# Patient Record
Sex: FEMALE | Race: WHITE | NOT HISPANIC OR LATINO | Employment: FULL TIME | URBAN - METROPOLITAN AREA
[De-identification: names, ages, dates, MRNs, and addresses within clinical notes are randomized per-mention and may not be internally consistent; named-entity substitution may affect disease eponyms.]

---

## 2019-07-05 ENCOUNTER — APPOINTMENT (EMERGENCY)
Dept: RADIOLOGY | Facility: HOSPITAL | Age: 49
End: 2019-07-05
Payer: COMMERCIAL

## 2019-07-05 ENCOUNTER — HOSPITAL ENCOUNTER (EMERGENCY)
Facility: HOSPITAL | Age: 49
Discharge: HOME | End: 2019-07-05
Attending: EMERGENCY MEDICINE | Admitting: EMERGENCY MEDICINE
Payer: COMMERCIAL

## 2019-07-05 VITALS
TEMPERATURE: 97.9 F | SYSTOLIC BLOOD PRESSURE: 128 MMHG | HEART RATE: 72 BPM | WEIGHT: 192 LBS | RESPIRATION RATE: 18 BRPM | DIASTOLIC BLOOD PRESSURE: 76 MMHG | OXYGEN SATURATION: 96 %

## 2019-07-05 DIAGNOSIS — R07.9 CHEST PAIN: Primary | ICD-10-CM

## 2019-07-05 LAB
ALBUMIN SERPL BCP-MCNC: 4.2 G/DL (ref 3.5–5)
ALP SERPL-CCNC: 90 U/L (ref 46–116)
ALT SERPL W P-5'-P-CCNC: 54 U/L (ref 12–78)
ANION GAP SERPL CALCULATED.3IONS-SCNC: 9 MMOL/L (ref 4–13)
AST SERPL W P-5'-P-CCNC: 20 U/L (ref 5–45)
BASOPHILS # BLD AUTO: 0.04 THOUSANDS/ΜL (ref 0–0.1)
BASOPHILS NFR BLD AUTO: 1 % (ref 0–1)
BILIRUB SERPL-MCNC: 0.4 MG/DL (ref 0.2–1)
BUN SERPL-MCNC: 12 MG/DL (ref 5–25)
CALCIUM SERPL-MCNC: 8.8 MG/DL (ref 8.3–10.1)
CHLORIDE SERPL-SCNC: 102 MMOL/L (ref 100–108)
CO2 SERPL-SCNC: 28 MMOL/L (ref 21–32)
CREAT SERPL-MCNC: 0.68 MG/DL (ref 0.6–1.3)
DEPRECATED D DIMER PPP: 220 NG/ML (FEU) (ref 190–520)
EOSINOPHIL # BLD AUTO: 0.07 THOUSAND/ΜL (ref 0–0.61)
EOSINOPHIL NFR BLD AUTO: 1 % (ref 0–6)
ERYTHROCYTE [DISTWIDTH] IN BLOOD BY AUTOMATED COUNT: 12.8 % (ref 11.6–15.1)
GFR SERPL CREATININE-BSD FRML MDRD: 102 ML/MIN/1.73SQ M
GLUCOSE SERPL-MCNC: 120 MG/DL (ref 65–140)
HCG SERPL QL: NEGATIVE
HCT VFR BLD AUTO: 42.6 % (ref 34.8–46.1)
HGB BLD-MCNC: 13.8 G/DL (ref 11.5–15.4)
IMM GRANULOCYTES # BLD AUTO: 0.01 THOUSAND/UL (ref 0–0.2)
IMM GRANULOCYTES NFR BLD AUTO: 0 % (ref 0–2)
LIPASE SERPL-CCNC: 152 U/L (ref 73–393)
LYMPHOCYTES # BLD AUTO: 1.68 THOUSANDS/ΜL (ref 0.6–4.47)
LYMPHOCYTES NFR BLD AUTO: 31 % (ref 14–44)
MAGNESIUM SERPL-MCNC: 2.1 MG/DL (ref 1.6–2.6)
MCH RBC QN AUTO: 30.5 PG (ref 26.8–34.3)
MCHC RBC AUTO-ENTMCNC: 32.4 G/DL (ref 31.4–37.4)
MCV RBC AUTO: 94 FL (ref 82–98)
MONOCYTES # BLD AUTO: 0.49 THOUSAND/ΜL (ref 0.17–1.22)
MONOCYTES NFR BLD AUTO: 9 % (ref 4–12)
NEUTROPHILS # BLD AUTO: 3.07 THOUSANDS/ΜL (ref 1.85–7.62)
NEUTS SEG NFR BLD AUTO: 58 % (ref 43–75)
NRBC BLD AUTO-RTO: 0 /100 WBCS
PLATELET # BLD AUTO: 295 THOUSANDS/UL (ref 149–390)
PMV BLD AUTO: 10.6 FL (ref 8.9–12.7)
POTASSIUM SERPL-SCNC: 4.2 MMOL/L (ref 3.5–5.3)
PROT SERPL-MCNC: 7.9 G/DL (ref 6.4–8.2)
RBC # BLD AUTO: 4.52 MILLION/UL (ref 3.81–5.12)
SODIUM SERPL-SCNC: 139 MMOL/L (ref 136–145)
TROPONIN I SERPL-MCNC: <0.02 NG/ML
WBC # BLD AUTO: 5.36 THOUSAND/UL (ref 4.31–10.16)

## 2019-07-05 PROCEDURE — 85025 COMPLETE CBC W/AUTO DIFF WBC: CPT | Performed by: EMERGENCY MEDICINE

## 2019-07-05 PROCEDURE — 84484 ASSAY OF TROPONIN QUANT: CPT | Performed by: EMERGENCY MEDICINE

## 2019-07-05 PROCEDURE — 71046 X-RAY EXAM CHEST 2 VIEWS: CPT

## 2019-07-05 PROCEDURE — 99285 EMERGENCY DEPT VISIT HI MDM: CPT

## 2019-07-05 PROCEDURE — 83735 ASSAY OF MAGNESIUM: CPT | Performed by: EMERGENCY MEDICINE

## 2019-07-05 PROCEDURE — 85379 FIBRIN DEGRADATION QUANT: CPT | Performed by: EMERGENCY MEDICINE

## 2019-07-05 PROCEDURE — 80053 COMPREHEN METABOLIC PANEL: CPT | Performed by: EMERGENCY MEDICINE

## 2019-07-05 PROCEDURE — 84703 CHORIONIC GONADOTROPIN ASSAY: CPT | Performed by: EMERGENCY MEDICINE

## 2019-07-05 PROCEDURE — 36415 COLL VENOUS BLD VENIPUNCTURE: CPT | Performed by: EMERGENCY MEDICINE

## 2019-07-05 PROCEDURE — 83690 ASSAY OF LIPASE: CPT | Performed by: EMERGENCY MEDICINE

## 2019-07-05 PROCEDURE — 93005 ELECTROCARDIOGRAM TRACING: CPT

## 2019-07-05 RX ORDER — MAGNESIUM 30 MG
30 TABLET ORAL 2 TIMES DAILY
COMMUNITY

## 2019-07-05 RX ORDER — PANTOPRAZOLE SODIUM 40 MG/1
40 TABLET, DELAYED RELEASE ORAL DAILY
COMMUNITY
End: 2020-12-28 | Stop reason: SDUPTHER

## 2019-07-05 NOTE — ED PROVIDER NOTES
History  Chief Complaint   Patient presents with    Chest Pain     pt describes CP since 6am wednesday morning  episode at [facility] morning awoke pt 10/10 pain sub-sternal  pt then describes intermittent CP since then but constant over past day at 5/10       Chest Pain   Pain location:  Substernal area and epigastric  Pain quality: pressure    Pain radiates to:  Does not radiate  Pain radiates to the back: no    Pain severity:  Moderate  Onset quality:  Gradual  Duration:  3 days  Timing:  Intermittent  Progression:  Waxing and waning  Chronicity:  Recurrent  Context comment:  Lying down, improves with certain positions  Relieved by:  Nothing  Worsened by:  Nothing tried  Ineffective treatments:  None tried  Associated symptoms: no abdominal pain, no cough, no fever, no shortness of breath and not vomiting    Risk factors: surgery    Risk factors: no high cholesterol, no hypertension, not male, no prior DVT/PE and no smoking        Prior to Admission Medications   Prescriptions Last Dose Informant Patient Reported? Taking?   magnesium 30 MG tablet Unknown at Unknown time  Yes No   Sig: Take 30 mg by mouth 2 (two) times a day   pantoprazole (PROTONIX) 40 mg tablet Unknown at Unknown time  Yes No   Sig: Take 40 mg by mouth daily      Facility-Administered Medications: None       Past Medical History:   Diagnosis Date    Gastric ulcer     [facility]     V-tach [facility])        Past Surgical History:   Procedure Laterality Date    ABDOMINAL SURGERY      colon sx (adnoma removed)    CARDIAC SURGERY      ablasion 09/2015 ([facility])    CHOLECYSTECTOMY         History reviewed  No pertinent family history  I have reviewed and agree with the history as documented      Social History     Tobacco Use    Smoking status: Never Smoker    Smokeless tobacco: Never Used   Substance Use Topics    Alcohol use: Not Currently    Drug use: Not Currently        Review of Systems Constitutional: Negative for activity change, appetite change and fever  Eyes: Negative for redness  Respiratory: Negative for cough and shortness of breath  Cardiovascular: Positive for chest pain  Gastrointestinal: Negative for abdominal distention, abdominal pain, constipation, diarrhea and vomiting  Skin: Negative for color change and pallor  All other systems reviewed and are negative  Physical Exam  Physical Exam   Constitutional: She is oriented to person, place, and time  She appears well-developed and well-nourished  No distress  HENT:   Head: Normocephalic and atraumatic  Eyes: EOM are normal    Neck: Normal range of motion  Cardiovascular: Normal rate, regular rhythm and normal heart sounds  No murmur heard  Pulmonary/Chest: Effort normal and breath sounds normal  No respiratory distress  She has no wheezes  She has no rales  Abdominal: Soft  She exhibits no distension  There is no tenderness  Musculoskeletal: Normal range of motion  She exhibits no edema or deformity  Lymphadenopathy:     She has no cervical adenopathy  Neurological: She is alert and oriented to person, place, and time  No cranial nerve deficit  She exhibits normal muscle tone  Coordination normal    Skin: Capillary refill takes less than 2 seconds  No rash noted  No erythema  Psychiatric: She has a normal mood and affect  Her behavior is normal    Nursing note and vitals reviewed        Vital Signs  ED Triage Vitals [07/05/19 1133]   Temperature Pulse Respirations Blood Pressure SpO2   97 9 °F (36 6 °C) 88 18 157/77 99 %      Temp Source Heart Rate Source Patient Position - Orthostatic VS BP Location FiO2 (%)   Tympanic Monitor Sitting Right arm --      Pain Score       5           Vitals:    07/05/19 1133 07/05/19 1215 07/05/19 1245   BP: 157/77 135/81 128/76   Pulse: 88 76 72   Patient Position - Orthostatic VS: Sitting Sitting Sitting         Visual Acuity      ED Medications  Medications - No data to display    Diagnostic Studies  Results Reviewed     Procedure Component Value Units Date/Time    Magnesium [[id number]]  (Normal) Collected:  07/05/19 1154    Lab Status:  Final result Specimen:  Blood from Arm, Right Updated:  07/05/19 1229     Magnesium 2 1 mg/dL     Lipase [[id number]]  (Normal) Collected:  07/05/19 1154    Lab Status:  Final result Specimen:  Blood from Arm, Right Updated:  07/05/19 1229     Lipase 152 u/L     hCG, qualitative pregnancy [[id number]]  (Normal) Collected:  07/05/19 1154    Lab Status:  Final result Specimen:  Blood from Arm, Right Updated:  07/05/19 1229     Preg, Serum Negative    Troponin I [[id number]]  (Normal) Collected:  07/05/19 1154    Lab Status:  Final result Specimen:  Blood from Arm, Right Updated:  07/05/19 1225     Troponin I <0 02 ng/mL     Comprehensive metabolic panel [[id number]] Collected:  07/05/19 1154    Lab Status:  Final result Specimen:  Blood from Arm, Right Updated:  07/05/19 1222     Sodium 139 mmol/L      Potassium 4 2 mmol/L      Chloride 102 mmol/L      CO2 28 mmol/L      ANION GAP 9 mmol/L      BUN 12 mg/dL      Creatinine 0 68 mg/dL      Glucose 120 mg/dL      Calcium 8 8 mg/dL      AST 20 U/L      ALT 54 U/L      Alkaline Phosphatase 90 U/L      Total Protein 7 9 g/dL      Albumin 4 2 g/dL      Total Bilirubin 0 40 mg/dL      eGFR 102 ml/min/1 73sq m     Narrative:       Dilcia guidelines for Chronic Kidney Disease (CKD):     Stage 1 with normal or high GFR (GFR > 90 mL/min/1 73 square meters)    Stage 2 Mild CKD (GFR = 60-89 mL/min/1 73 square meters)    Stage 3A Moderate CKD (GFR = 45-59 mL/min/1 73 square meters)    Stage 3B Moderate CKD (GFR = 30-44 mL/min/1 73 square meters)    Stage 4 Severe CKD (GFR = 15-29 mL/min/1 73 square meters)    Stage 5 End Stage CKD (GFR <15 mL/min/1 73 square meters)  Note: GFR calculation is accurate only with a steady state creatinine    D-Dimer [[id number]]  (Normal) Collected:  07/05/19 1154    Lab Status:  Final result Specimen:  Blood from Arm, Right Updated:  07/05/19 1221     D-Dimer, Quant 220 ng/ml (FEU)     CBC and differential [[id number]] Collected:  07/05/19 1154    Lab Status:  Final result Specimen:  Blood from Arm, Right Updated:  07/05/19 1206     WBC 5 36 Thousand/uL      RBC 4 52 Million/uL      Hemoglobin 13 8 g/dL      Hematocrit 42 6 %      MCV 94 fL      MCH 30 5 pg      MCHC 32 4 g/dL      RDW 12 8 %      MPV 10 6 fL      Platelets 195 Thousands/uL      nRBC 0 /100 WBCs      Neutrophils Relative 58 %      Immat GRANS % 0 %      Lymphocytes Relative 31 %      Monocytes Relative 9 %      Eosinophils Relative 1 %      Basophils Relative 1 %      Neutrophils Absolute 3 07 Thousands/µL      Immature Grans Absolute 0 01 Thousand/uL      Lymphocytes Absolute 1 68 Thousands/µL      Monocytes Absolute 0 49 Thousand/µL      Eosinophils Absolute 0 07 Thousand/µL      Basophils Absolute 0 04 Thousands/µL                  XR chest 2 views   ED Interpretation by Kate Bawja MD (07/05 1317)   NAD                 Procedures  Procedures       ED Course         HEART Risk Score      Most Recent Value   History  0 Filed at: 07/05/2019 1246   ECG  0 Filed at: 07/05/2019 1246   Age  1 Filed at: 07/05/2019 1246   Risk Factors  0 Filed at: 07/05/2019 1246   Troponin  0 Filed at: 07/05/2019 1246   Heart Score Risk Calculator   History  0 Filed at: 07/05/2019 1246   ECG  0 Filed at: 07/05/2019 1246   Age  1 Filed at: 07/05/2019 1246   Risk Factors  0 Filed at: 07/05/2019 1246   Troponin  0 Filed at: 07/05/2019 1246   HEART Score  1 Filed at: 07/05/2019 1246   HEART Score  1 Filed at: 07/05/2019 1246            PERC Rule for PE      Most Recent Value   PERC Rule for PE   Age >=50  1 Filed at: 07/05/2019 1309   HR >=100  0 Filed at: 07/05/2019 1309   O2 Sat on room air < 95%  0 Filed at: 07/05/2019 1309   History of PE or DVT  0 Filed at: 07/05/2019 1309   Recent trauma or surgery  0 Filed at: 07/05/2019 1309   Hemoptysis  0 Filed at: 07/05/2019 1309   Exogenous estrogen  0 Filed at: 07/05/2019 1309   Unilateral leg swelling  0 Filed at: 07/05/2019 1309   PERC Rule for PE Results  1 Filed at: 07/05/2019 1309                Bennie' Criteria for PE      Most Recent Value   Wells' Criteria for PE   Clinical signs and symptoms of DVT  0 Filed at: 07/05/2019 1309   PE is primary diagnosis or equally likely  0 Filed at: 07/05/2019 1309   HR >100  0 Filed at: 07/05/2019 1309   Immobilization at least 3 days or Surgery in the previous 4 weeks  0 Filed at: 07/05/2019 1309   Previous, objectively diagnosed PE or DVT  0 Filed at: 07/05/2019 1309   Hemoptysis  0 Filed at: 07/05/2019 1309   Malignancy with treatment within 6 months or palliative  0 Filed at: 07/05/2019 1309   Bennie' Criteria Total  0 Filed at: 07/05/2019 1309            MDM  Number of Diagnoses or Management Options  Diagnosis management comments: Patient with substernal/epigastric chest pain described as pressure  Also with fatigue  Pain started on Wednesday  She called her primary care physician today and was asked to come the emergency department for evaluation  She describes pain going to her right shoulder and somewhat associated breathing  She has no previous history of ACS, PE, DVT  She has a long history of GERD, gastritis and has had cardiac ablation in the past   She has no history of ACS  Pain has improved  She declines need for medications while in ER  Vital signs stable  Patient overall appears okay well  EKG with no acute findings, similar to previous  Troponin negative, D-dimer negative, lipase, CMP negative  Chest x-ray with no acute cardiopulmonary abnormality  Suspect symptoms related to GERD  Patient on Protonix  She has a gastroenterologist that she will follow up with for tries to her regimen  No indication for 2nd troponin as chest pain has been going on for 3 days    Low heart score, no additional ER or hospital workup indicated  Amount and/or Complexity of Data Reviewed  Clinical lab tests: ordered and reviewed  Tests in the radiology section of CPT®: ordered  Tests in the medicine section of CPT®: ordered and reviewed  Independent visualization of images, tracings, or specimens: yes        Disposition  Final diagnoses:   Chest pain     Time reflects when diagnosis was documented in both MDM as applicable and the Disposition within this note     Time User Action Codes Description Comment    7/5/2019  1:06 PM Ольга Early Add [R07 9] Chest pain       ED Disposition     ED Disposition Condition Date/Time Comment    Discharge Stable Fri Jul 5, 2019  1:16 PM Nikolas Pennington discharge to home/self care  Follow-up Information     Follow up With Specialties Details Why Contact Info Additional Information    Your Gastroenterologist  Schedule an appointment as soon as possible for a visit in 1 week As needed      [address] [facility] Emergency Medicine Go to  If symptoms worsen [address] [place] [address]  [phone] [facility], [place], [place], [address]          Patient's Medications   Discharge Prescriptions    No medications on file     No discharge procedures on file      ED Provider  Electronically Signed by           Christ Glass MD  07/05/19 3889

## 2019-07-05 NOTE — ED NOTES
Pt reports pain to right anterior chest has been noted to be more associated with deep respirations today  Dr Zen Powell aware  D-dimer ordered         Sigrid Johnson RN  07/05/19 9052

## 2019-07-06 LAB
ATRIAL RATE: 82 BPM
P AXIS: 20 DEGREES
PR INTERVAL: 128 MS
QRS AXIS: 90 DEGREES
QRSD INTERVAL: 80 MS
QT INTERVAL: 352 MS
QTC INTERVAL: 411 MS
T WAVE AXIS: 34 DEGREES
VENTRICULAR RATE: 82 BPM

## 2019-07-06 PROCEDURE — 93010 ELECTROCARDIOGRAM REPORT: CPT | Performed by: INTERNAL MEDICINE

## 2020-09-28 ENCOUNTER — APPOINTMENT (EMERGENCY)
Dept: RADIOLOGY | Facility: HOSPITAL | Age: 50
End: 2020-09-28
Payer: COMMERCIAL

## 2020-09-28 ENCOUNTER — HOSPITAL ENCOUNTER (EMERGENCY)
Facility: HOSPITAL | Age: 50
Discharge: HOME | End: 2020-09-29
Attending: EMERGENCY MEDICINE
Payer: COMMERCIAL

## 2020-09-28 VITALS
RESPIRATION RATE: 20 BRPM | OXYGEN SATURATION: 97 % | SYSTOLIC BLOOD PRESSURE: 154 MMHG | WEIGHT: 203 LBS | DIASTOLIC BLOOD PRESSURE: 74 MMHG | TEMPERATURE: 96.3 F | HEART RATE: 90 BPM

## 2020-09-28 DIAGNOSIS — M54.9 BACK PAIN: Primary | ICD-10-CM

## 2020-09-28 PROCEDURE — G1004 CDSM NDSC: HCPCS

## 2020-09-28 PROCEDURE — 72128 CT CHEST SPINE W/O DYE: CPT

## 2020-09-28 PROCEDURE — 71046 X-RAY EXAM CHEST 2 VIEWS: CPT

## 2020-09-28 PROCEDURE — 72125 CT NECK SPINE W/O DYE: CPT

## 2020-09-28 PROCEDURE — 99284 EMERGENCY DEPT VISIT MOD MDM: CPT | Performed by: EMERGENCY MEDICINE

## 2020-09-28 PROCEDURE — 99284 EMERGENCY DEPT VISIT MOD MDM: CPT

## 2020-09-28 RX ORDER — LIDOCAINE 50 MG/G
1 PATCH TOPICAL ONCE
Status: DISCONTINUED | OUTPATIENT
Start: 2020-09-28 | End: 2020-09-29 | Stop reason: HOSPADM

## 2020-09-28 RX ORDER — NAPROXEN 500 MG/1
500 TABLET ORAL 2 TIMES DAILY WITH MEALS
Qty: 20 TABLET | Refills: 0 | Status: SHIPPED | OUTPATIENT
Start: 2020-09-28

## 2020-09-28 RX ORDER — DIAZEPAM 5 MG/1
10 TABLET ORAL ONCE
Status: COMPLETED | OUTPATIENT
Start: 2020-09-28 | End: 2020-09-28

## 2020-09-28 RX ORDER — PREDNISONE 50 MG/1
50 TABLET ORAL DAILY
Qty: 5 TABLET | Refills: 0 | Status: SHIPPED | OUTPATIENT
Start: 2020-09-28 | End: 2020-10-03

## 2020-09-28 RX ORDER — LIDOCAINE 50 MG/G
1 PATCH TOPICAL DAILY
Qty: 30 PATCH | Refills: 0 | Status: SHIPPED | OUTPATIENT
Start: 2020-09-28 | End: 2020-12-28

## 2020-09-28 RX ORDER — NAPROXEN 500 MG/1
500 TABLET ORAL ONCE
Status: COMPLETED | OUTPATIENT
Start: 2020-09-28 | End: 2020-09-28

## 2020-09-28 RX ORDER — CYCLOBENZAPRINE HCL 10 MG
10 TABLET ORAL ONCE
Status: DISCONTINUED | OUTPATIENT
Start: 2020-09-28 | End: 2020-09-28

## 2020-09-28 RX ORDER — DIAZEPAM 10 MG/1
10 TABLET ORAL EVERY 12 HOURS PRN
Qty: 20 TABLET | Refills: 0 | Status: SHIPPED | OUTPATIENT
Start: 2020-09-28 | End: 2020-12-28

## 2020-09-28 RX ADMIN — DIAZEPAM 10 MG: 5 TABLET ORAL at 21:35

## 2020-09-28 RX ADMIN — LIDOCAINE 1 PATCH: 50 PATCH CUTANEOUS at 22:41

## 2020-09-28 RX ADMIN — NAPROXEN 500 MG: 500 TABLET ORAL at 21:23

## 2020-09-28 RX ADMIN — PREDNISONE 50 MG: 20 TABLET ORAL at 21:22

## 2020-09-29 NOTE — ED PROVIDER NOTES
History  Chief Complaint   Patient presents with    Back Pain     started with upper back,scapular pain yesterday, progressively worsening, painful to breathe  states dx with mild cervical stenosis  denies any pain or trauma took 8oomg motrin at 5;30 with no relief     12-year-old female with past history herniated disc to cervical spine, SVT, V-tach, gastritis, presents to the ED for evaluation of worsening mid upper back pain over the past few days  Patient states that she has history of hernia dysuria cervical spine with some cervical stenosis that was diagnosed a few years ago  Patient is intermittently followed by orthopedic surgery  Patient states that she went hiking last week and fell forward  Patient started to have mid thoracic pain over the past few days that is not helping with over-the-counter medication  Patient is having burning sensation with movement  Patient came to the ED for further evaluation  Patient complains of some mild tingling sensation when the pain is at its highest intensity to her bilateral upper extremities  History provided by:  Patient  Back Pain   Associated symptoms: no abdominal pain, no chest pain, no dysuria, no fever, no headaches, no numbness and no weakness        Prior to Admission Medications   Prescriptions Last Dose Informant Patient Reported? Taking?   magnesium 30 MG tablet Not Taking at Unknown time  Yes No   Sig: Take 30 mg by mouth 2 (two) times a day   pantoprazole (PROTONIX) 40 mg tablet Not Taking at Unknown time  Yes No   Sig: Take 40 mg by mouth daily      Facility-Administered Medications: None       Past Medical History:   Diagnosis Date    Gastritis     SVT (supraventricular tachycardia) (HCC)     V-tach [facility])        Past Surgical History:   Procedure Laterality Date    ABDOMINAL SURGERY      colon sx (adnoma removed)    CARDIAC SURGERY      ablasion 09/2015 ([facility])    CHOLECYSTECTOMY         History reviewed   No pertinent family history  I have reviewed and agree with the history as documented  E-Cigarette/Vaping    E-Cigarette Use Never User      E-Cigarette/Vaping Substances     Social History     Tobacco Use    Smoking status: Former Smoker    Smokeless tobacco: Never Used   Substance Use Topics    Alcohol use: Not Currently    Drug use: Not Currently       Review of Systems   Constitutional: Negative for activity change, appetite change, chills and fever  HENT: Negative for congestion and ear pain  Eyes: Negative for pain and discharge  Respiratory: Negative for cough, chest tightness, shortness of breath, wheezing and stridor  Cardiovascular: Negative for chest pain and palpitations  Gastrointestinal: Negative for abdominal distention, abdominal pain, constipation, diarrhea and nausea  Endocrine: Negative for cold intolerance  Genitourinary: Negative for dysuria, frequency and urgency  Musculoskeletal: Positive for back pain  Negative for arthralgias  Skin: Negative for color change and rash  Allergic/Immunologic: Negative for environmental allergies and food allergies  Neurological: Negative for dizziness, weakness, numbness and headaches  Hematological: Negative for adenopathy  Psychiatric/Behavioral: Negative for agitation, behavioral problems and confusion  The patient is not nervous/anxious  All other systems reviewed and are negative  Physical Exam  Physical Exam  Vitals signs and nursing note reviewed  Constitutional:       Appearance: She is well-developed  HENT:      Head: Normocephalic and atraumatic  Eyes:      Conjunctiva/sclera: Conjunctivae normal    Neck:      Musculoskeletal: Normal range of motion and neck supple  Cardiovascular:      Rate and Rhythm: Normal rate and regular rhythm  Heart sounds: Normal heart sounds  Pulmonary:      Effort: Pulmonary effort is normal       Breath sounds: Normal breath sounds     Abdominal:      General: Bowel sounds are normal  There is no distension  Palpations: Abdomen is soft  Tenderness: There is no abdominal tenderness  Musculoskeletal: Normal range of motion  Comments: Mid spinous and paraspinous tenderness to palpation noted to the mid spinous and left thoracic spine  Pulses intact bilateral upper extremities   strength intact bilateral upper extremities  Sensation intact bilateral upper extremities  Skin:     General: Skin is warm and dry  Neurological:      Mental Status: She is alert and oriented to person, place, and time  Psychiatric:         Behavior: Behavior normal          Thought Content: Thought content normal          Judgment: Judgment normal          Vital Signs  ED Triage Vitals [09/28/20 2015]   Temperature Pulse Respirations Blood Pressure SpO2   (!) 96 3 °F (35 7 °C) 90 20 154/74 97 %      Temp Source Heart Rate Source Patient Position - Orthostatic VS BP Location FiO2 (%)   Tympanic Monitor Sitting Right arm --      Pain Score       8           Vitals:    09/28/20 2015   BP: 154/74   Pulse: 90   Patient Position - Orthostatic VS: Sitting         Visual Acuity      ED Medications  Medications   lidocaine (LIDODERM) 5 % patch 1 patch (1 patch Topical Medication Applied 9/28/20 2241)   predniSONE tablet 50 mg (50 mg Oral Given 9/28/20 2122)   naproxen (NAPROSYN) tablet 500 mg (500 mg Oral Given 9/28/20 2123)   diazepam (VALIUM) tablet 10 mg (10 mg Oral Given 9/28/20 2135)       Diagnostic Studies  Results Reviewed     None                 CT spine cervical without contrast   Final Result by Bertrand Mcrae DO (09/28 2229)      No cervical spine fracture or traumatic malalignment                     Workstation performed: [id number]         CT thoracic spine without contrast   Final Result by Bertrand Mcrae DO (09/28 2236)      No evidence of displaced acute fracture of the thoracic spine            Workstation performed: [id number]         XR chest 2 views    (Results Pending) Procedures  Procedures         ED Course  ED Course as of Sep 28 2353   Mon Sep 28, 2020   2339 Patient re-examined at bedside  Patient is starting to feel better  MDM  Number of Diagnoses or Management Options  Back pain: new and requires workup  Diagnosis management comments: Obtain chest x-ray, CT C-spine and thoracic spine to rule out any acute bony abnormalities  Give prednisone, naproxen, Valium, Lidoderm patch and reassess pain  Amount and/or Complexity of Data Reviewed  Tests in the radiology section of CPT®: ordered and reviewed  Tests in the medicine section of CPT®: ordered and reviewed  Review and summarize past medical records: yes  Independent visualization of images, tracings, or specimens: yes    Risk of Complications, Morbidity, and/or Mortality  General comments: No acute bony abnormalities noted on radiology results  Patient's pain started to improve with medications in the ED  At this point patient is discharged home on steroids, muscle relaxants, pain medication, Lidoderm patch with follow-up to orthopedic surgery for further evaluation and management  Close return instructions given to return to the ER for any worsening symptoms  Patient agrees with discharge plan  Patient well appearing at time of discharge  Patient Progress  Patient progress: improved      Disposition  Final diagnoses:   Back pain     Time reflects when diagnosis was documented in both MDM as applicable and the Disposition within this note     Time User Action Codes Description Comment    9/28/2020 11:40 PM Clifton Posadas Add [M54 9] Back pain       ED Disposition     ED Disposition Condition Date/Time Comment    Discharge Stable Mon Sep 28, 2020 11:40 PM Boris Doctor discharge to home/self care              Follow-up Information     Follow up With Specialties Details Why Contact Basilia Pulido,  Orthopedic Surgery, Hand Surgery Schedule an appointment as soon as possible for a visit   [facility], [address] [place] [address],[address]  [facility]  [phone]            Patient's Medications   Discharge Prescriptions    DIAZEPAM (VALIUM) 10 MG TABLET    Take 1 tablet (10 mg total) by mouth every 12 (twelve) hours as needed for muscle spasms for up to 10 days       Start Date: 9/28/2020 End Date: 10/8/2020       Order Dose: 10 mg       Quantity: 20 tablet    Refills: 0    LIDOCAINE (LIDODERM) 5 %    Apply 1 patch topically daily Remove & Discard patch within 12 hours or as directed by MD       Start Date: 9/28/2020 End Date: --       Order Dose: 1 patch       Quantity: 30 patch    Refills: 0    NAPROXEN (NAPROSYN) 500 MG TABLET    Take 1 tablet (500 mg total) by mouth 2 (two) times a day with meals       Start Date: 9/28/2020 End Date: --       Order Dose: 500 mg       Quantity: 20 tablet    Refills: 0    PREDNISONE 50 MG TABLET    Take 1 tablet (50 mg total) by mouth daily for 5 days       Start Date: 9/28/2020 End Date: 10/3/2020       Order Dose: 50 mg       Quantity: 5 tablet    Refills: 0         PDMP Review     None          ED Provider  Electronically Signed by           Tiesha Nielsen DO  09/28/20 0186

## 2020-09-30 ENCOUNTER — TELEPHONE (OUTPATIENT)
Dept: PHYSICAL THERAPY | Facility: OTHER | Age: 50
End: 2020-09-30

## 2020-09-30 NOTE — TELEPHONE ENCOUNTER
Nurse reached out to discuss recent referral entered for  [facility] program and offerings  Patient declined to participate in the program at this time  "I'm feeling 100% better"  Nurse confirmed patient has [facility] contact information and encouraged to call program back if needed in the future  Patient agreed and very appreciative of call and discussion  Nurse wished her well and referral closed per protocol

## 2020-12-15 ENCOUNTER — APPOINTMENT (OUTPATIENT)
Dept: RADIOLOGY | Facility: CLINIC | Age: 50
End: 2020-12-15
Payer: COMMERCIAL

## 2020-12-15 ENCOUNTER — OFFICE VISIT (OUTPATIENT)
Dept: OBGYN CLINIC | Facility: CLINIC | Age: 50
End: 2020-12-15
Payer: COMMERCIAL

## 2020-12-15 VITALS
HEIGHT: 65 IN | HEART RATE: 77 BPM | DIASTOLIC BLOOD PRESSURE: 83 MMHG | WEIGHT: 218 LBS | BODY MASS INDEX: 36.32 KG/M2 | SYSTOLIC BLOOD PRESSURE: 141 MMHG

## 2020-12-15 DIAGNOSIS — M75.32 CALCIFIC TENDONITIS OF LEFT SHOULDER: Primary | ICD-10-CM

## 2020-12-15 DIAGNOSIS — M25.512 LEFT SHOULDER PAIN, UNSPECIFIED CHRONICITY: ICD-10-CM

## 2020-12-15 PROCEDURE — 73030 X-RAY EXAM OF SHOULDER: CPT

## 2020-12-15 PROCEDURE — 99204 OFFICE O/P NEW MOD 45 MIN: CPT | Performed by: ORTHOPAEDIC SURGERY

## 2020-12-16 ENCOUNTER — TELEPHONE (OUTPATIENT)
Dept: OBGYN CLINIC | Facility: HOSPITAL | Age: 50
End: 2020-12-16

## 2020-12-16 DIAGNOSIS — M75.32 CALCIFIC TENDONITIS OF LEFT SHOULDER: Primary | ICD-10-CM

## 2020-12-16 RX ORDER — PREDNISONE 20 MG/1
20 TABLET ORAL
Qty: 15 TABLET | Refills: 0 | Status: SHIPPED | OUTPATIENT
Start: 2020-12-16 | End: 2021-03-23 | Stop reason: ALTCHOICE

## 2020-12-28 ENCOUNTER — HOSPITAL ENCOUNTER (EMERGENCY)
Facility: HOSPITAL | Age: 50
Discharge: HOME | End: 2020-12-28
Attending: EMERGENCY MEDICINE | Admitting: EMERGENCY MEDICINE
Payer: COMMERCIAL

## 2020-12-28 ENCOUNTER — APPOINTMENT (EMERGENCY)
Dept: RADIOLOGY | Facility: HOSPITAL | Age: 50
End: 2020-12-28
Payer: COMMERCIAL

## 2020-12-28 VITALS
HEART RATE: 84 BPM | RESPIRATION RATE: 13 BRPM | SYSTOLIC BLOOD PRESSURE: 125 MMHG | OXYGEN SATURATION: 96 % | DIASTOLIC BLOOD PRESSURE: 67 MMHG | TEMPERATURE: 98.9 F

## 2020-12-28 DIAGNOSIS — R07.89 ATYPICAL CHEST PAIN: Primary | ICD-10-CM

## 2020-12-28 LAB
ALBUMIN SERPL BCP-MCNC: 3.6 G/DL (ref 3.5–5)
ALP SERPL-CCNC: 103 U/L (ref 46–116)
ALT SERPL W P-5'-P-CCNC: 122 U/L (ref 12–78)
ANION GAP SERPL CALCULATED.3IONS-SCNC: 11 MMOL/L (ref 4–13)
AST SERPL W P-5'-P-CCNC: 42 U/L (ref 5–45)
BASOPHILS # BLD AUTO: 0.03 THOUSANDS/ΜL (ref 0–0.1)
BASOPHILS NFR BLD AUTO: 0 % (ref 0–1)
BILIRUB SERPL-MCNC: 0.3 MG/DL (ref 0.2–1)
BUN SERPL-MCNC: 19 MG/DL (ref 5–25)
CALCIUM SERPL-MCNC: 8.6 MG/DL (ref 8.3–10.1)
CHLORIDE SERPL-SCNC: 101 MMOL/L (ref 100–108)
CO2 SERPL-SCNC: 26 MMOL/L (ref 21–32)
CREAT SERPL-MCNC: 0.72 MG/DL (ref 0.6–1.3)
D DIMER PPP FEU-MCNC: 0.35 UG/ML FEU
EOSINOPHIL # BLD AUTO: 0.12 THOUSAND/ΜL (ref 0–0.61)
EOSINOPHIL NFR BLD AUTO: 1 % (ref 0–6)
ERYTHROCYTE [DISTWIDTH] IN BLOOD BY AUTOMATED COUNT: 13.3 % (ref 11.6–15.1)
GFR SERPL CREATININE-BSD FRML MDRD: 97 ML/MIN/1.73SQ M
GLUCOSE SERPL-MCNC: 92 MG/DL (ref 65–140)
HCT VFR BLD AUTO: 41.3 % (ref 34.8–46.1)
HGB BLD-MCNC: 12.7 G/DL (ref 11.5–15.4)
IMM GRANULOCYTES # BLD AUTO: 0.05 THOUSAND/UL (ref 0–0.2)
IMM GRANULOCYTES NFR BLD AUTO: 1 % (ref 0–2)
LYMPHOCYTES # BLD AUTO: 2.29 THOUSANDS/ΜL (ref 0.6–4.47)
LYMPHOCYTES NFR BLD AUTO: 22 % (ref 14–44)
MCH RBC QN AUTO: 31 PG (ref 26.8–34.3)
MCHC RBC AUTO-ENTMCNC: 30.8 G/DL (ref 31.4–37.4)
MCV RBC AUTO: 101 FL (ref 82–98)
MONOCYTES # BLD AUTO: 0.93 THOUSAND/ΜL (ref 0.17–1.22)
MONOCYTES NFR BLD AUTO: 9 % (ref 4–12)
NEUTROPHILS # BLD AUTO: 7.06 THOUSANDS/ΜL (ref 1.85–7.62)
NEUTS SEG NFR BLD AUTO: 67 % (ref 43–75)
NRBC BLD AUTO-RTO: 0 /100 WBCS
PLATELET # BLD AUTO: 292 THOUSANDS/UL (ref 149–390)
PMV BLD AUTO: 9.9 FL (ref 8.9–12.7)
POTASSIUM SERPL-SCNC: 4.7 MMOL/L (ref 3.5–5.3)
PROT SERPL-MCNC: 7.1 G/DL (ref 6.4–8.2)
RBC # BLD AUTO: 4.1 MILLION/UL (ref 3.81–5.12)
SODIUM SERPL-SCNC: 138 MMOL/L (ref 136–145)
TROPONIN I SERPL-MCNC: <0.02 NG/ML
WBC # BLD AUTO: 10.48 THOUSAND/UL (ref 4.31–10.16)

## 2020-12-28 PROCEDURE — 85379 FIBRIN DEGRADATION QUANT: CPT | Performed by: EMERGENCY MEDICINE

## 2020-12-28 PROCEDURE — 84484 ASSAY OF TROPONIN QUANT: CPT | Performed by: EMERGENCY MEDICINE

## 2020-12-28 PROCEDURE — 99285 EMERGENCY DEPT VISIT HI MDM: CPT

## 2020-12-28 PROCEDURE — 85025 COMPLETE CBC W/AUTO DIFF WBC: CPT | Performed by: EMERGENCY MEDICINE

## 2020-12-28 PROCEDURE — 99285 EMERGENCY DEPT VISIT HI MDM: CPT | Performed by: EMERGENCY MEDICINE

## 2020-12-28 PROCEDURE — 80053 COMPREHEN METABOLIC PANEL: CPT | Performed by: EMERGENCY MEDICINE

## 2020-12-28 PROCEDURE — 36415 COLL VENOUS BLD VENIPUNCTURE: CPT | Performed by: EMERGENCY MEDICINE

## 2020-12-28 PROCEDURE — 93005 ELECTROCARDIOGRAM TRACING: CPT

## 2020-12-28 RX ORDER — PANTOPRAZOLE SODIUM 40 MG/1
40 TABLET, DELAYED RELEASE ORAL DAILY
Qty: 30 TABLET | Refills: 0 | Status: SHIPPED | OUTPATIENT
Start: 2020-12-28

## 2020-12-29 LAB
ATRIAL RATE: 84 BPM
P AXIS: 55 DEGREES
PR INTERVAL: 132 MS
QRS AXIS: 70 DEGREES
QRSD INTERVAL: 90 MS
QT INTERVAL: 368 MS
QTC INTERVAL: 434 MS
T WAVE AXIS: 31 DEGREES
VENTRICULAR RATE: 84 BPM

## 2020-12-29 PROCEDURE — 93010 ELECTROCARDIOGRAM REPORT: CPT | Performed by: INTERNAL MEDICINE

## 2021-01-07 ENCOUNTER — HOSPITAL ENCOUNTER (OUTPATIENT)
Dept: RADIOLOGY | Facility: HOSPITAL | Age: 51
Discharge: HOME | End: 2021-01-07
Attending: ORTHOPAEDIC SURGERY

## 2021-03-23 ENCOUNTER — OFFICE VISIT (OUTPATIENT)
Dept: URGENT CARE | Facility: CLINIC | Age: 51
End: 2021-03-23
Payer: COMMERCIAL

## 2021-03-23 ENCOUNTER — APPOINTMENT (OUTPATIENT)
Dept: RADIOLOGY | Facility: CLINIC | Age: 51
End: 2021-03-23
Payer: COMMERCIAL

## 2021-03-23 VITALS
WEIGHT: 215 LBS | OXYGEN SATURATION: 97 % | RESPIRATION RATE: 18 BRPM | HEIGHT: 65 IN | HEART RATE: 75 BPM | TEMPERATURE: 98 F | DIASTOLIC BLOOD PRESSURE: 89 MMHG | BODY MASS INDEX: 35.82 KG/M2 | SYSTOLIC BLOOD PRESSURE: 159 MMHG

## 2021-03-23 DIAGNOSIS — M79.671 RIGHT FOOT PAIN: ICD-10-CM

## 2021-03-23 DIAGNOSIS — M10.9 PODAGRA: Primary | ICD-10-CM

## 2021-03-23 PROCEDURE — 73630 X-RAY EXAM OF FOOT: CPT

## 2021-03-23 PROCEDURE — 99213 OFFICE O/P EST LOW 20 MIN: CPT | Performed by: FAMILY MEDICINE

## 2021-03-23 RX ORDER — MELATONIN
DAILY
COMMUNITY

## 2021-03-23 RX ORDER — ZINC GLUCONATE 50 MG
25 TABLET ORAL DAILY
COMMUNITY

## 2021-03-23 RX ORDER — PREDNISONE 20 MG/1
20 TABLET ORAL DAILY
Qty: 7 TABLET | Refills: 0 | Status: SHIPPED | OUTPATIENT
Start: 2021-03-23 | End: 2021-03-30

## 2021-03-23 RX ORDER — MULTIVIT WITH MINERALS/LUTEIN
1000 TABLET ORAL DAILY
COMMUNITY

## 2021-03-23 RX ORDER — PSYLLIUM HUSK 0.4 G
CAPSULE ORAL
COMMUNITY

## 2021-03-23 RX ORDER — VITAMIN E 268 MG
400 CAPSULE ORAL DAILY
COMMUNITY

## 2021-03-23 RX ORDER — COLCHICINE 0.6 MG/1
1.2 TABLET ORAL ONCE
Qty: 3 TABLET | Refills: 0 | Status: SHIPPED | OUTPATIENT
Start: 2021-03-23 | End: 2021-05-21

## 2021-03-23 NOTE — PROGRESS NOTES
[facility]        NAME: Khai Miguel is a 44 y o  female  : 1970    MRN: [id number]  DATE: 2021  TIME: 1:29 PM    Assessment and Plan   Podagra [M10 9]  1  Podagra  XR foot 3+ vw right    colchicine (COLCRYS) 0 6 mg tablet    predniSONE 20 mg tablet     Gout per clinical evaluation  No fractures on x-ray with a bipartite helical sesamoid; official read pending  Prescribed a course of colchicine and low-dose prednisone  Patient encouraged to continue with icing and elevation  Patient Instructions     Follow up with PCP in 3-5 days  Proceed to  ER if symptoms worsen  Chief Complaint     Chief Complaint   Patient presents with    Joint Pain     rt great toe pain and swelling  Being worked up for  autoimmune disease          History of Present Illness     53-year-old female presents today due to right great toe pain which started yesterday  Woke up yesterday morning in her normal state of health  Denies any obvious trauma to her foot  She describes a progressive worsening of right great toe pain and swelling  Required some extra-strength Motrin, elevation and icing which provided transient relief  Very early this morning, she was woken up due to excruciating pain in the right great toe  Described as a deep aching, throbbing, tightness and stiffness in the right 1st MTP joint  No past history of gout, past history of transient inflammatory disorders and is undergoing further evaluation  Denies any obvious fevers, chills, respiratory symptoms or chest pain  Review of Systems   Review of Systems   Constitutional: Negative for chills and fever  Respiratory: Negative for cough, shortness of breath and wheezing  Cardiovascular: Negative for chest pain  Gastrointestinal: Negative for abdominal pain  Musculoskeletal: Positive for arthralgias, gait problem and joint swelling  Skin: Negative for rash  Neurological: Negative for dizziness and headaches         Current Medications       Current Outpatient Medications:     Ascorbic Acid (vitamin C) 1000 MG tablet, Take 1,000 mg by mouth daily, Disp: , Rfl:     Calcium Carb-Cholecalciferol (Calcium 1000 + D) 1000-800 MG-UNIT TABS, Take by mouth, Disp: , Rfl:     cholecalciferol (VITAMIN D3) 1,000 units tablet, Take by mouth daily, Disp: , Rfl:     co-enzyme Q-10 30 MG capsule, Take 30 mg by mouth 3 (three) times a day, Disp: , Rfl:     magnesium 30 MG tablet, Take 30 mg by mouth 2 (two) times a day Calcium/Magnesium/Zinc Supplement, Disp: , Rfl:     Probiotic Product (Probiotic + Omega-3) CAPS, Take by mouth, Disp: , Rfl:     vitamin E, tocopherol, 400 units capsule, Take 400 Units by mouth daily, Disp: , Rfl:     zinc gluconate 50 mg tablet, Take 25 mg by mouth daily, Disp: , Rfl:     colchicine (COLCRYS) 0 6 mg tablet, Take 2 tablets (1 2 mg total) by mouth once for 1 dose followed by 1 tablet after 1 hour should pain persist , Disp: 3 tablet, Rfl: 0    naproxen (NAPROSYN) 500 mg tablet, Take 1 tablet (500 mg total) by mouth 2 (two) times a day with meals (Patient not taking: Reported on 3/23/2021), Disp: 20 tablet, Rfl: 0    pantoprazole (PROTONIX) 40 mg tablet, Take 1 tablet (40 mg total) by mouth daily (Patient not taking: Reported on 3/23/2021), Disp: 30 tablet, Rfl: 0    predniSONE 20 mg tablet, Take 1 tablet (20 mg total) by mouth daily for 7 days, Disp: 7 tablet, Rfl: 0    Current Allergies     Allergies as of 03/23/2021 - Reviewed 03/23/2021   Allergen Reaction Noted    Codeine  09/28/2020    Morphine  07/05/2019    Percocet [oxycodone-acetaminophen]  07/05/2019    Sulfa antibiotics  07/05/2019    Zithromax [azithromycin]  07/05/2019            The following portions of the patient's history were reviewed and updated as appropriate: allergies, current medications, past family history, past medical history, past social history, past surgical history and problem list      Past Medical History:   Diagnosis Date    Gastritis     [facility]     [facility]ach [facility])        Past Surgical History:   Procedure Laterality Date    ABDOMINAL SURGERY      colon sx (adnoma removed)    APPENDECTOMY      CARDIAC SURGERY      ablasion 09/2015 [facility])    CHOLECYSTECTOMY         Family History   Problem Relation Age of Onset    No Known Problems Mother     No Known Problems Father          Medications have been verified  Objective   /89   Pulse 75   Temp 98 °F (36 7 °C)   Resp 18   Ht 5' 5" (1 651 m)   Wt 97 5 kg (215 lb)   SpO2 97%   BMI 35 78 kg/m²   No LMP recorded  Patient is postmenopausal        Physical Exam     Physical Exam  Vitals signs and nursing note reviewed  Constitutional:       General: She is in acute distress (Right great toe pain and swelling)  Appearance: Normal appearance  She is obese  She is not ill-appearing, toxic-appearing or diaphoretic  HENT:      Head: Normocephalic and atraumatic  Eyes:      General:         Right eye: No discharge  Left eye: No discharge  Conjunctiva/sclera: Conjunctivae normal    Pulmonary:      Effort: Pulmonary effort is normal    Musculoskeletal:         General: Swelling and tenderness present  No signs of injury  Comments: Decreased active ROM of the right great toe with swelling and tenderness to light palpation  Skin:     General: Skin is warm  Findings: No erythema  Neurological:      General: No focal deficit present  Mental Status: She is alert and oriented to person, place, and time  Psychiatric:         Mood and Affect: Mood normal          Behavior: Behavior normal          Thought Content:  Thought content normal          Judgment: Judgment normal

## 2021-05-21 ENCOUNTER — HOSPITAL ENCOUNTER (EMERGENCY)
Facility: HOSPITAL | Age: 51
Discharge: HOME | End: 2021-05-21
Attending: EMERGENCY MEDICINE | Admitting: EMERGENCY MEDICINE
Payer: COMMERCIAL

## 2021-05-21 VITALS
SYSTOLIC BLOOD PRESSURE: 115 MMHG | WEIGHT: 213 LBS | HEART RATE: 90 BPM | TEMPERATURE: 96.7 F | BODY MASS INDEX: 35.45 KG/M2 | RESPIRATION RATE: 20 BRPM | DIASTOLIC BLOOD PRESSURE: 68 MMHG | OXYGEN SATURATION: 98 %

## 2021-05-21 DIAGNOSIS — R00.2 PALPITATIONS: Primary | ICD-10-CM

## 2021-05-21 PROCEDURE — 93005 ELECTROCARDIOGRAM TRACING: CPT

## 2021-05-21 PROCEDURE — 99284 EMERGENCY DEPT VISIT MOD MDM: CPT | Performed by: EMERGENCY MEDICINE

## 2021-05-21 PROCEDURE — 99285 EMERGENCY DEPT VISIT HI MDM: CPT

## 2021-05-21 NOTE — ED PROCEDURE NOTE
PROCEDURE  ECG 12 Lead Documentation Only    Date/Time: 5/21/2021 2:56 PM  Performed by: Beto Polk DO  Authorized by: Beto Polk DO     ECG reviewed by me, the ED Provider: yes    Patient location:  ED  Interpretation:     Interpretation: normal    Rate:     ECG rate:  96    ECG rate assessment: normal    Rhythm:     Rhythm: sinus rhythm    Ectopy:     Ectopy: none    ST segments:     ST segments:  Normal  T waves:     T waves: normal           Beto Polk DO  05/21/21 1456

## 2021-05-21 NOTE — ED NOTES
Pt is refusing blood work at this time, Dr Sharyle Profit made aware      Afsaneh Florian, RN  05/21/21 5524

## 2021-05-23 LAB
ATRIAL RATE: 96 BPM
P AXIS: 55 DEGREES
PR INTERVAL: 138 MS
QRS AXIS: 70 DEGREES
QRSD INTERVAL: 84 MS
QT INTERVAL: 344 MS
QTC INTERVAL: 434 MS
T WAVE AXIS: 34 DEGREES
VENTRICULAR RATE: 96 BPM

## 2021-05-23 PROCEDURE — 93010 ELECTROCARDIOGRAM REPORT: CPT | Performed by: INTERNAL MEDICINE

## 2021-05-23 NOTE — ED PROVIDER NOTES
History  Chief Complaint   Patient presents with    Rapid Heart Rate     pt reports of rapid heart rate started around 1:45 pm today  pt has hx of svt and runs of v tach  Had an ablation 2015 Will patient presents for evaluation of a rapid heart rate that started around 1:45 p m  Today  Patient has a history of SVT and nonsustained V-tach  Patient underwent ablation 2015 has very few episodes since then  Patient states she can tell the difference between the nonsustained V-tach and SVT  Today's episodes felt like SVT  Patient is also wearing a continuous heart monitor  At time of exam patient states that the symptoms have resolved she no longer feels like her is racing  History provided by:  Patient   used: No    Rapid Heart Rate  Associated symptoms: no back pain, no chest pain, no cough, no nausea, no numbness, no shortness of breath, no vomiting and no weakness        Prior to Admission Medications   Prescriptions Last Dose Informant Patient Reported? Taking?    Ascorbic Acid (vitamin C) 1000 MG tablet   Yes No   Sig: Take 1,000 mg by mouth daily   Calcium Carb-Cholecalciferol (Calcium 1000 + D) 1000-800 MG-UNIT TABS   Yes No   Sig: Take by mouth   Probiotic Product (Probiotic + Omega-3) CAPS   Yes No   Sig: Take by mouth   cholecalciferol (VITAMIN D3) 1,000 units tablet   Yes No   Sig: Take by mouth daily   co-enzyme Q-10 30 MG capsule   Yes No   Sig: Take 30 mg by mouth 3 (three) times a day   magnesium 30 MG tablet   Yes No   Sig: Take 30 mg by mouth 2 (two) times a day Calcium/Magnesium/Zinc Supplement   naproxen (NAPROSYN) 500 mg tablet Not Taking at Unknown time  No No   Sig: Take 1 tablet (500 mg total) by mouth 2 (two) times a day with meals   Patient not taking: Reported on 3/23/2021   pantoprazole (PROTONIX) 40 mg tablet Not Taking at Unknown time  No No   Sig: Take 1 tablet (40 mg total) by mouth daily   Patient not taking: Reported on 3/23/2021   vitamin E, tocopherol, 400 units capsule   Yes No   Sig: Take 400 Units by mouth daily   zinc gluconate 50 mg tablet   Yes No   Sig: Take 25 mg by mouth daily      Facility-Administered Medications: None       Past Medical History:   Diagnosis Date    Gastritis     [facility]cardia[facility])     [facility])        Past Surgical History:   Procedure Laterality Date    ABDOMINAL SURGERY      colon sx (adnoma removed)    APPENDECTOMY      CARDIAC SURGERY      ablasion 09/2015 [facility])    CHOLECYSTECTOMY         Family History   Problem Relation Age of Onset    No Known Problems Mother     No Known Problems Father      I have reviewed and agree with the history as documented  E-Cigarette/Vaping    E-Cigarette Use Never User      E-Cigarette/Vaping Substances     Social History     Tobacco Use    Smoking status: Former Smoker    Smokeless tobacco: Never Used   Substance Use Topics    Alcohol use: Not Currently    Drug use: Not Currently       Review of Systems   Constitutional: Negative for chills and fever  HENT: Negative for congestion and sore throat  Respiratory: Negative for cough and shortness of breath  Cardiovascular: Positive for palpitations  Negative for chest pain  Gastrointestinal: Negative for abdominal pain, nausea and vomiting  Genitourinary: Negative for difficulty urinating and dysuria  Musculoskeletal: Negative for back pain and neck pain  Neurological: Negative for weakness, numbness and headaches  All other systems reviewed and are negative  Physical Exam  Physical Exam  Vitals signs and nursing note reviewed  Constitutional:       General: She is not in acute distress  Appearance: Normal appearance  HENT:      Head: Atraumatic  Right Ear: External ear normal       Left Ear: External ear normal       Nose: Nose normal       Mouth/Throat:      Mouth: Mucous membranes are moist       Pharynx: Oropharynx is clear     Eyes: General: No scleral icterus  Conjunctiva/sclera: Conjunctivae normal    Cardiovascular:      Rate and Rhythm: Normal rate and regular rhythm  Pulses: Normal pulses  Pulmonary:      Effort: Pulmonary effort is normal  No respiratory distress  Breath sounds: Normal breath sounds  No wheezing, rhonchi or rales  Abdominal:      General: Abdomen is flat  Bowel sounds are normal  There is no distension  Palpations: Abdomen is soft  Tenderness: There is no abdominal tenderness  There is no guarding or rebound  Musculoskeletal: Normal range of motion  General: No deformity  Skin:     Capillary Refill: Capillary refill takes less than 2 seconds  Findings: No rash  Neurological:      General: No focal deficit present  Mental Status: She is alert and oriented to person, place, and time  Vital Signs  ED Triage Vitals   Temperature Pulse Respirations Blood Pressure SpO2   05/21/21 1427 05/21/21 1427 05/21/21 1427 05/21/21 1427 05/21/21 1427   (!) 96 7 °F (35 9 °C) 104 18 159/89 99 %      Temp Source Heart Rate Source Patient Position - Orthostatic VS BP Location FiO2 (%)   05/21/21 1427 05/21/21 1427 05/21/21 1600 05/21/21 1600 --   Tympanic Monitor Lying Right arm       Pain Score       --                  Vitals:    05/21/21 1427 05/21/21 1445 05/21/21 1515 05/21/21 1600   BP: 159/89 133/68 117/70 115/68   Pulse: 104 102 94 90   Patient Position - Orthostatic VS:    Lying         Visual Acuity      ED Medications  Medications - No data to display    Diagnostic Studies  Results Reviewed     None                 No orders to display              Procedures  Procedures         ED Course                                           MDM  Number of Diagnoses or Management Options  Palpitations:   Diagnosis management comments: Pulse ox 98% room air indicating adequate oxygenation      Discussed observation and checking electrolytes are with blood work at make sure that SVT does on her  Patient was agreeable to a short period of observation but not any blood work at this time  Patient remained stable in the ER no further runs of SVT will follow up outpatient with her cardiologist        Amount and/or Complexity of Data Reviewed  Decide to obtain previous medical records or to obtain history from someone other than the patient: yes  Review and summarize past medical records: yes    Patient Progress  Patient progress: stable      Disposition  Final diagnoses:   Palpitations     Time reflects when diagnosis was documented in both MDM as applicable and the Disposition within this note     Time User Action Codes Description Comment    5/21/2021  3:58 PM Jameelcon Gomez Add [R00 2] Palpitations       ED Disposition     ED Disposition Condition Date/Time Comment    Discharge Stable Fri May 21, 2021  3:58 PM Rory Neri discharge to home/self care              Follow-up Information     Follow up With Specialties Details Why Contact Info    Jamar Wiggins MD Family Medicine In 1 week  [address]  [phone]            Discharge Medication List as of 5/21/2021  3:58 PM      CONTINUE these medications which have NOT CHANGED    Details   Ascorbic Acid (vitamin C) 1000 MG tablet Take 1,000 mg by mouth daily, Historical Med      Calcium Carb-Cholecalciferol (Calcium 1000 + D) 1000-800 MG-UNIT TABS Take by mouth, Historical Med      cholecalciferol (VITAMIN D3) 1,000 units tablet Take by mouth daily, Historical Med      co-enzyme Q-10 30 MG capsule Take 30 mg by mouth 3 (three) times a day, Historical Med      magnesium 30 MG tablet Take 30 mg by mouth 2 (two) times a day Calcium/Magnesium/Zinc Supplement, Historical Med      naproxen (NAPROSYN) 500 mg tablet Take 1 tablet (500 mg total) by mouth 2 (two) times a day with meals, Starting Mon 9/28/2020, Normal      pantoprazole (PROTONIX) 40 mg tablet Take 1 tablet (40 mg total) by mouth daily, Starting Mon 12/28/2020, Normal      Probiotic Product (Probiotic + Omega-3) CAPS Take by mouth, Historical Med      vitamin E, tocopherol, 400 units capsule Take 400 Units by mouth daily, Historical Med      zinc gluconate 50 mg tablet Take 25 mg by mouth daily, Historical Med           No discharge procedures on file      PDMP Review       Value Time User    PDMP Reviewed  Yes 12/16/2020  6:03 PM Kevin Morris MD          ED Provider  Electronically Signed by           Jacqueline Vilchis DO  05/23/21 0002

## 2021-12-04 ENCOUNTER — HOSPITAL ENCOUNTER (EMERGENCY)
Facility: HOSPITAL | Age: 51
Discharge: HOME | End: 2021-12-04
Attending: EMERGENCY MEDICINE | Admitting: EMERGENCY MEDICINE
Payer: COMMERCIAL

## 2021-12-04 VITALS
BODY MASS INDEX: 32.92 KG/M2 | DIASTOLIC BLOOD PRESSURE: 74 MMHG | HEIGHT: 66 IN | HEART RATE: 77 BPM | TEMPERATURE: 98.3 F | RESPIRATION RATE: 20 BRPM | SYSTOLIC BLOOD PRESSURE: 115 MMHG | WEIGHT: 204.8 LBS | OXYGEN SATURATION: 97 %

## 2021-12-04 DIAGNOSIS — K64.4 EXTERNAL HEMORRHOID: Primary | ICD-10-CM

## 2021-12-04 PROCEDURE — 99284 EMERGENCY DEPT VISIT MOD MDM: CPT | Performed by: PHYSICIAN ASSISTANT

## 2021-12-04 PROCEDURE — 99283 EMERGENCY DEPT VISIT LOW MDM: CPT

## 2024-04-19 ENCOUNTER — OFFICE VISIT (OUTPATIENT)
Dept: URGENT CARE | Facility: CLINIC | Age: 54
End: 2024-04-19
Payer: COMMERCIAL

## 2024-04-19 VITALS
OXYGEN SATURATION: 98 % | WEIGHT: 205.2 LBS | TEMPERATURE: 97.4 F | RESPIRATION RATE: 18 BRPM | HEIGHT: 65 IN | BODY MASS INDEX: 34.19 KG/M2 | SYSTOLIC BLOOD PRESSURE: 158 MMHG | HEART RATE: 70 BPM | DIASTOLIC BLOOD PRESSURE: 83 MMHG
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
## 2024-04-19 PROCEDURE — 99213 OFFICE O/P EST LOW 20 MIN: CPT | Performed by: PREVENTIVE MEDICINE

## 2024-04-19 RX ORDER — DOXYCYCLINE 100 MG/1
TABLET ORAL
Qty: 10 TABLET | Refills: 1 | Status: SHIPPED | OUTPATIENT
Start: 2024-04-19 | End: 2024-04-19

## 2024-04-19 NOTE — PROGRESS NOTES
[facility]        NAME: Rocio Garcia is a 53 y.o. female  : 1970    MRN: [id number]  DATE: 2024  TIME: 10:07 AM    Assessment and Plan   Tick bite, unspecified site, initial encounter [W57.XXXA]  1. Tick bite, unspecified site, initial encounter  doxycycline (ADOXA) 100 MG tablet            Patient Instructions       Follow up with PCP in 3-5 days.  Proceed to  ER if symptoms worsen.    If tests have been performed at [facility], our office will contact you with results if changes need to be made to the care plan discussed with you at the visit.  You can review your full results on [facility].    Chief Complaint     Chief Complaint   Patient presents with    Tick Bite     Tick bite, head is imbedded in neck/shoulder on left side- noticed is yesterday, itchy, irritated          History of Present Illness       Tick bite where the neck meets the shoulder left side    Tick Bite        Review of Systems   Review of Systems   Skin:  Positive for wound.         Current Medications       Current Outpatient Medications:     Ascorbic Acid (vitamin C) 1000 MG tablet, Take 1,000 mg by mouth daily, Disp: , Rfl:     Calcium Carb-Cholecalciferol (Calcium 1000 + D) 1000-800 MG-UNIT TABS, Take by mouth, Disp: , Rfl:     cholecalciferol (VITAMIN D3) 1,000 units tablet, Take by mouth daily, Disp: , Rfl:     co-enzyme Q-10 30 MG capsule, Take 30 mg by mouth 3 (three) times a day, Disp: , Rfl:     doxycycline (ADOXA) 100 MG tablet, Take 2 tablets after tick bite, Disp: 10 tablet, Rfl: 1    magnesium 30 MG tablet, Take 30 mg by mouth 2 (two) times a day Calcium/Magnesium/Zinc Supplement, Disp: , Rfl:     Probiotic Product (Probiotic + Omega-3) CAPS, Take by mouth, Disp: , Rfl:     vitamin E, tocopherol, 400 units capsule, Take 400 Units by mouth daily, Disp: , Rfl:     zinc gluconate 50 mg tablet, Take 25 mg by mouth daily, Disp: , Rfl:     Lidocaine-Hydrocortisone Ace 1-1 % CREA, Apply 1 application  "topically 2 (two) times a day (Patient not taking: Reported on 4/19/2024), Disp: 30 g, Rfl: 0    naproxen (NAPROSYN) 500 mg tablet, Take 1 tablet (500 mg total) by mouth 2 (two) times a day with meals (Patient not taking: Reported on 3/23/2021), Disp: 20 tablet, Rfl: 0    pantoprazole (PROTONIX) 40 mg tablet, Take 1 tablet (40 mg total) by mouth daily (Patient not taking: Reported on 3/23/2021), Disp: 30 tablet, Rfl: 0    Current Allergies     Allergies as of 04/19/2024 - Reviewed 04/19/2024   Allergen Reaction Noted    Codeine  09/28/2020    Morphine  07/05/2019    Percocet [oxycodone-acetaminophen]  07/05/2019    Sulfa antibiotics  07/05/2019    Zithromax [azithromycin]  07/05/2019    Latex Rash 12/04/2021            The following portions of the patient's history were reviewed and updated as appropriate: allergies, current medications, past family history, past medical history, past social history, past surgical history and problem list.     Past Medical History:   Diagnosis Date    Gastritis     SVT (supraventricular tachycardia) (HCC)     V-tach (HCC)        Past Surgical History:   Procedure Laterality Date    ABDOMINAL SURGERY      colon sx (adnoma removed)    APPENDECTOMY      CARDIAC SURGERY      ablasion 09/2015 ([facility]    CHOLECYSTECTOMY         Family History   Problem Relation Age of Onset    No Known Problems Mother     No Known Problems Father          Medications have been verified.        Objective   /83   Pulse 70   Temp (!) 97.4 °F (36.3 °C)   Resp 18   Ht 5' 5\" (1.651 m)   Wt 93.1 kg (205 lb 3.2 oz)   SpO2 98%   BMI 34.15 kg/m²   No LMP recorded. Patient is postmenopausal.       Physical Exam     Physical Exam  Skin:     Comments: Erythematous area where the neck meets the shoulder with slight swelling.                   "

## 2024-10-21 ENCOUNTER — OFFICE VISIT (OUTPATIENT)
Dept: URGENT CARE | Facility: CLINIC | Age: 54
End: 2024-10-21
Payer: COMMERCIAL

## 2024-10-21 VITALS
TEMPERATURE: 97.5 F | SYSTOLIC BLOOD PRESSURE: 128 MMHG | HEART RATE: 98 BPM | WEIGHT: 193 LBS | RESPIRATION RATE: 14 BRPM | DIASTOLIC BLOOD PRESSURE: 88 MMHG | OXYGEN SATURATION: 94 % | BODY MASS INDEX: 32.12 KG/M2

## 2024-10-21 DIAGNOSIS — J02.9 SORE THROAT: ICD-10-CM

## 2024-10-21 DIAGNOSIS — J06.9 UPPER RESPIRATORY TRACT INFECTION, UNSPECIFIED TYPE: Primary | ICD-10-CM

## 2024-10-21 LAB — S PYO AG THROAT QL: NEGATIVE

## 2024-10-21 PROCEDURE — 87636 SARSCOV2 & INF A&B AMP PRB: CPT

## 2024-10-21 PROCEDURE — 99213 OFFICE O/P EST LOW 20 MIN: CPT

## 2024-10-21 PROCEDURE — 87880 STREP A ASSAY W/OPTIC: CPT

## 2024-10-21 NOTE — PROGRESS NOTES
[facility]        NAME: Rocio Garcia is a 53 y.o. female  : 1970    MRN: [id number]  DATE: 2024  TIME: 10:34 AM    Assessment and Plan   Upper respiratory tract infection, unspecified type [J06.9]  1. Upper respiratory tract infection, unspecified type        2. Sore throat  POCT rapid strepA    Covid/Flu- Office Collect Normal    Covid/Flu- Office Collect Normal        Rapid Strep negative, will send COVID/flu PCR and f/u if positive. Suspect viral illness given clinical presentation. VSS in clinic, appears in no acute distress. Educated on use of OTC products for symptoms. Advised close follow-up with PCP or to report to the ER if symptoms worsen. Patient verbalizes understanding and agreeable to plan.         Patient Instructions     Symptoms of a viral infection may linger for up to 10 days. Your contagious period is the first 5-7 days of symptom onset. Continue over-the-counter products for symptoms: tylenol for fevers, ibuprofen for body aches, flonase (fluticasone) with nasal saline for congestion, robitussin/coricidin for cough, and airborne/emergen-c for vitamin supplementation. Follow-up with PCP in 3-5 days. Report to ER if symptoms worsen.         Chief Complaint     Chief Complaint   Patient presents with    Cold Like Symptoms     Pt presents with fever, chills, sore throat, body aches; started yesterday         History of Present Illness       53 year old female presents for evaluation of fever (tmax 101.7F), body aches, congestion, and sore throat x 1 day. 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 She denies any other known sick contacts or triggers. She reports h/o seasonal allergies but denies h/o asthma. She is UTD on COVID and flu vaccines. She denies cough, SOB, or productive sputum. She has not tried any interventions for symptoms.    URI   This is a new problem. The current episode started yesterday. The problem has been unchanged. The maximum  temperature recorded prior to her arrival was 101 - 101.9 F. The fever has been present for Less than 1 day. Associated symptoms include congestion, coughing, headaches, joint pain, rhinorrhea and a sore throat. Pertinent negatives include no abdominal pain, chest pain, diarrhea, dysuria, ear pain, joint swelling, nausea, neck pain, plugged ear sensation, rash, sinus pain, sneezing, swollen glands, vomiting or wheezing. She has tried nothing for the symptoms. The treatment provided no relief.       Review of Systems   Review of Systems   Constitutional:  Positive for activity change, fatigue and fever. Negative for appetite change and chills.   HENT:  Positive for congestion, postnasal drip, rhinorrhea and sore throat. Negative for ear pain, sinus pressure, sinus pain, sneezing and trouble swallowing.    Eyes:  Negative for visual disturbance.   Respiratory:  Positive for cough. Negative for chest tightness, shortness of breath and wheezing.    Cardiovascular:  Negative for chest pain.   Gastrointestinal:  Negative for abdominal pain, constipation, diarrhea, nausea and vomiting.   Genitourinary:  Negative for dysuria.   Musculoskeletal:  Positive for joint pain and myalgias. Negative for arthralgias, back pain and neck pain.   Skin:  Negative for color change, pallor, rash and wound.   Allergic/Immunologic: Negative for environmental allergies and food allergies.   Neurological:  Positive for headaches. Negative for dizziness and light-headedness.         Current Medications       Current Outpatient Medications:     Ascorbic Acid (vitamin C) 1000 MG tablet, Take 1,000 mg by mouth daily (Patient not taking: Reported on 10/21/2024), Disp: , Rfl:     Calcium Carb-Cholecalciferol (Calcium 1000 + D) 1000-800 MG-UNIT TABS, Take by mouth (Patient not taking: Reported on 10/21/2024), Disp: , Rfl:     cholecalciferol (VITAMIN D3) 1,000 units tablet, Take by mouth daily (Patient not taking: Reported on 10/21/2024), Disp: ,  Rfl:     co-enzyme Q-10 30 MG capsule, Take 30 mg by mouth 3 (three) times a day (Patient not taking: Reported on 10/21/2024), Disp: , Rfl:     Lidocaine-Hydrocortisone Ace 1-1 % CREA, Apply 1 application topically 2 (two) times a day (Patient not taking: Reported on 4/19/2024), Disp: 30 g, Rfl: 0    magnesium 30 MG tablet, Take 30 mg by mouth 2 (two) times a day Calcium/Magnesium/Zinc Supplement (Patient not taking: Reported on 10/21/2024), Disp: , Rfl:     naproxen (NAPROSYN) 500 mg tablet, Take 1 tablet (500 mg total) by mouth 2 (two) times a day with meals (Patient not taking: Reported on 3/23/2021), Disp: 20 tablet, Rfl: 0    pantoprazole (PROTONIX) 40 mg tablet, Take 1 tablet (40 mg total) by mouth daily (Patient not taking: Reported on 3/23/2021), Disp: 30 tablet, Rfl: 0    Probiotic Product (Probiotic + Omega-3) CAPS, Take by mouth (Patient not taking: Reported on 10/21/2024), Disp: , Rfl:     vitamin E, tocopherol, 400 units capsule, Take 400 Units by mouth daily (Patient not taking: Reported on 10/21/2024), Disp: , Rfl:     zinc gluconate 50 mg tablet, Take 25 mg by mouth daily (Patient not taking: Reported on 10/21/2024), Disp: , Rfl:     Current Allergies     Allergies as of 10/21/2024 - Reviewed 10/21/2024   Allergen Reaction Noted    Codeine Anaphylaxis 09/28/2020    Morphine Anaphylaxis 07/05/2019    Percocet [oxycodone-acetaminophen] Anaphylaxis 07/05/2019    Promethazine Anaphylaxis 07/08/2021    Sulfa antibiotics Itching 07/05/2019    Zithromax [azithromycin] Itching 07/05/2019    Latex Rash 12/04/2021            The following portions of the patient's history were reviewed and updated as appropriate: allergies, current medications, past family history, past medical history, past social history, past surgical history and problem list.     Past Medical History:   Diagnosis Date    Gastritis     SVT (supraventricular tachycardia) ([facility])     V-tach (HCC)        Past Surgical History:   Procedure  Laterality Date    ABDOMINAL SURGERY      colon sx (adnoma removed)    APPENDECTOMY      CARDIAC SURGERY      ablasion 09/2015 (enrrique mccormick)    CHOLECYSTECTOMY         Family History   Problem Relation Age of Onset    No Known Problems Mother     No Known Problems Father          Medications have been verified.        Objective   /88   Pulse 98   Temp 97.5 °F (36.4 °C)   Resp 14   Wt 87.5 kg (193 lb)   SpO2 94%   BMI 32.12 kg/m²        Physical Exam     Physical Exam  Vitals and nursing note reviewed.   Constitutional:       General: She is awake. She is not in acute distress.     Appearance: Normal appearance. She is well-developed and normal weight.   HENT:      Head: Normocephalic and atraumatic.      Right Ear: Hearing, tympanic membrane, ear canal and external ear normal.      Left Ear: Hearing, tympanic membrane, ear canal and external ear normal.      Nose: Congestion and rhinorrhea present.      Mouth/Throat:      Lips: Pink.      Mouth: Mucous membranes are moist.      Pharynx: Oropharynx is clear. Uvula midline. Postnasal drip present. No oropharyngeal exudate.      Tonsils: No tonsillar exudate or tonsillar abscesses. 2+ on the right. 2+ on the left.   Eyes:      Conjunctiva/sclera: Conjunctivae normal.   Cardiovascular:      Rate and Rhythm: Normal rate and regular rhythm.      Pulses: Normal pulses.      Heart sounds: Normal heart sounds.   Pulmonary:      Effort: Pulmonary effort is normal.      Breath sounds: Normal breath sounds.   Musculoskeletal:      Cervical back: Full passive range of motion without pain, normal range of motion and neck supple.   Lymphadenopathy:      Cervical: No cervical adenopathy.   Skin:     General: Skin is warm and dry.   Neurological:      General: No focal deficit present.      Mental Status: She is alert and oriented to person, place, and time.   Psychiatric:         Mood and Affect: Mood normal.         Behavior: Behavior normal. Behavior is  cooperative.         Thought Content: Thought content normal.         Judgment: Judgment normal.

## 2024-10-22 LAB
FLUAV RNA RESP QL NAA+PROBE: NEGATIVE
FLUBV RNA RESP QL NAA+PROBE: NEGATIVE
SARS-COV-2 RNA RESP QL NAA+PROBE: NEGATIVE

## 2024-10-23 ENCOUNTER — APPOINTMENT (EMERGENCY)
Dept: RADIOLOGY | Facility: HOSPITAL | Age: 54
End: 2024-10-23
Payer: COMMERCIAL

## 2024-10-23 ENCOUNTER — HOSPITAL ENCOUNTER (EMERGENCY)
Facility: HOSPITAL | Age: 54
Discharge: HOME | End: 2024-10-23
Attending: STUDENT IN AN ORGANIZED HEALTH CARE EDUCATION/TRAINING PROGRAM | Admitting: STUDENT IN AN ORGANIZED HEALTH CARE EDUCATION/TRAINING PROGRAM
Payer: COMMERCIAL

## 2024-10-23 VITALS
WEIGHT: 192.4 LBS | HEART RATE: 97 BPM | DIASTOLIC BLOOD PRESSURE: 94 MMHG | BODY MASS INDEX: 32.02 KG/M2 | TEMPERATURE: 99.7 F | OXYGEN SATURATION: 96 % | SYSTOLIC BLOOD PRESSURE: 147 MMHG | RESPIRATION RATE: 20 BRPM

## 2024-10-23 DIAGNOSIS — J18.9 PNEUMONIA: Primary | ICD-10-CM

## 2024-10-23 LAB
ALBUMIN SERPL BCG-MCNC: 4.2 G/DL (ref 3.5–5)
ALP SERPL-CCNC: 89 U/L (ref 34–104)
ALT SERPL W P-5'-P-CCNC: 44 U/L (ref 7–52)
ANION GAP SERPL CALCULATED.3IONS-SCNC: 10 MMOL/L (ref 4–13)
APTT PPP: 27 SECONDS (ref 23–34)
AST SERPL W P-5'-P-CCNC: 24 U/L (ref 13–39)
BASOPHILS # BLD AUTO: 0.05 THOUSANDS/ΜL (ref 0–0.1)
BASOPHILS NFR BLD AUTO: 1 % (ref 0–1)
BILIRUB SERPL-MCNC: 0.34 MG/DL (ref 0.2–1)
BUN SERPL-MCNC: 9 MG/DL (ref 5–25)
CALCIUM SERPL-MCNC: 8.6 MG/DL (ref 8.4–10.2)
CHLORIDE SERPL-SCNC: 103 MMOL/L (ref 96–108)
CO2 SERPL-SCNC: 24 MMOL/L (ref 21–32)
CREAT SERPL-MCNC: 0.68 MG/DL (ref 0.6–1.3)
EOSINOPHIL # BLD AUTO: 0.16 THOUSAND/ΜL (ref 0–0.61)
EOSINOPHIL NFR BLD AUTO: 3 % (ref 0–6)
ERYTHROCYTE [DISTWIDTH] IN BLOOD BY AUTOMATED COUNT: 13.1 % (ref 11.6–15.1)
FLUAV AG UPPER RESP QL IA.RAPID: NEGATIVE
FLUBV AG UPPER RESP QL IA.RAPID: NEGATIVE
GFR SERPL CREATININE-BSD FRML MDRD: 98 ML/MIN/1.73SQ M
GLUCOSE SERPL-MCNC: 99 MG/DL (ref 65–140)
HCT VFR BLD AUTO: 40.4 % (ref 34.8–46.1)
HGB BLD-MCNC: 13 G/DL (ref 11.5–15.4)
IMM GRANULOCYTES # BLD AUTO: 0.01 THOUSAND/UL (ref 0–0.2)
IMM GRANULOCYTES NFR BLD AUTO: 0 % (ref 0–2)
INR PPP: 0.87 (ref 0.85–1.19)
LACTATE SERPL-SCNC: 1.7 MMOL/L (ref 0.5–2)
LYMPHOCYTES # BLD AUTO: 1.52 THOUSANDS/ΜL (ref 0.6–4.47)
LYMPHOCYTES NFR BLD AUTO: 26 % (ref 14–44)
MCH RBC QN AUTO: 29.7 PG (ref 26.8–34.3)
MCHC RBC AUTO-ENTMCNC: 32.2 G/DL (ref 31.4–37.4)
MCV RBC AUTO: 92 FL (ref 82–98)
MONOCYTES # BLD AUTO: 0.93 THOUSAND/ΜL (ref 0.17–1.22)
MONOCYTES NFR BLD AUTO: 16 % (ref 4–12)
NEUTROPHILS # BLD AUTO: 3.24 THOUSANDS/ΜL (ref 1.85–7.62)
NEUTS SEG NFR BLD AUTO: 54 % (ref 43–75)
NRBC BLD AUTO-RTO: 0 /100 WBCS
PLATELET # BLD AUTO: 270 THOUSANDS/UL (ref 149–390)
PMV BLD AUTO: 9.7 FL (ref 8.9–12.7)
POTASSIUM SERPL-SCNC: 3.7 MMOL/L (ref 3.5–5.3)
PROCALCITONIN SERPL-MCNC: <0.05 NG/ML
PROT SERPL-MCNC: 7.5 G/DL (ref 6.4–8.4)
PROTHROMBIN TIME: 12.4 SECONDS (ref 12.3–15)
RBC # BLD AUTO: 4.37 MILLION/UL (ref 3.81–5.12)
S PYO DNA THROAT QL NAA+PROBE: NOT DETECTED
SARS-COV+SARS-COV-2 AG RESP QL IA.RAPID: NEGATIVE
SODIUM SERPL-SCNC: 137 MMOL/L (ref 135–147)
WBC # BLD AUTO: 5.91 THOUSAND/UL (ref 4.31–10.16)

## 2024-10-23 PROCEDURE — 36415 COLL VENOUS BLD VENIPUNCTURE: CPT | Performed by: PHYSICIAN ASSISTANT

## 2024-10-23 PROCEDURE — 87040 BLOOD CULTURE FOR BACTERIA: CPT | Performed by: PHYSICIAN ASSISTANT

## 2024-10-23 PROCEDURE — 84145 PROCALCITONIN (PCT): CPT | Performed by: PHYSICIAN ASSISTANT

## 2024-10-23 PROCEDURE — 99285 EMERGENCY DEPT VISIT HI MDM: CPT | Performed by: PHYSICIAN ASSISTANT

## 2024-10-23 PROCEDURE — 80053 COMPREHEN METABOLIC PANEL: CPT | Performed by: PHYSICIAN ASSISTANT

## 2024-10-23 PROCEDURE — 85025 COMPLETE CBC W/AUTO DIFF WBC: CPT | Performed by: PHYSICIAN ASSISTANT

## 2024-10-23 PROCEDURE — 99283 EMERGENCY DEPT VISIT LOW MDM: CPT

## 2024-10-23 PROCEDURE — 71045 X-RAY EXAM CHEST 1 VIEW: CPT

## 2024-10-23 PROCEDURE — 85730 THROMBOPLASTIN TIME PARTIAL: CPT | Performed by: PHYSICIAN ASSISTANT

## 2024-10-23 PROCEDURE — 87651 STREP A DNA AMP PROBE: CPT | Performed by: PHYSICIAN ASSISTANT

## 2024-10-23 PROCEDURE — 96360 HYDRATION IV INFUSION INIT: CPT

## 2024-10-23 PROCEDURE — 87811 SARS-COV-2 COVID19 W/OPTIC: CPT | Performed by: PHYSICIAN ASSISTANT

## 2024-10-23 PROCEDURE — 87804 INFLUENZA ASSAY W/OPTIC: CPT | Performed by: PHYSICIAN ASSISTANT

## 2024-10-23 PROCEDURE — 85610 PROTHROMBIN TIME: CPT | Performed by: PHYSICIAN ASSISTANT

## 2024-10-23 PROCEDURE — 83605 ASSAY OF LACTIC ACID: CPT | Performed by: PHYSICIAN ASSISTANT

## 2024-10-23 RX ORDER — AMOXICILLIN 250 MG/1
1000 CAPSULE ORAL ONCE
Status: COMPLETED | OUTPATIENT
Start: 2024-10-23 | End: 2024-10-23

## 2024-10-23 RX ORDER — BENZONATATE 100 MG/1
100 CAPSULE ORAL EVERY 8 HOURS
Qty: 21 CAPSULE | Refills: 0 | Status: SHIPPED | OUTPATIENT
Start: 2024-10-23

## 2024-10-23 RX ORDER — AMOXICILLIN 500 MG/1
1000 CAPSULE ORAL EVERY 8 HOURS SCHEDULED
Qty: 30 CAPSULE | Refills: 0 | Status: SHIPPED | OUTPATIENT
Start: 2024-10-23 | End: 2024-10-28

## 2024-10-23 RX ADMIN — AMOXICILLIN 1000 MG: 250 CAPSULE ORAL at 11:22

## 2024-10-23 RX ADMIN — SODIUM CHLORIDE 1000 ML: 0.9 INJECTION, SOLUTION INTRAVENOUS at 10:15

## 2024-10-23 NOTE — ED PROVIDER NOTES
Time reflects when diagnosis was documented in both MDM as applicable and the Disposition within this note       Time User Action Codes Description Comment    10/23/2024 11:17 AM Malachi Vasques Add [J18.9] Pneumonia           ED Disposition       ED Disposition   Discharge    Condition   Stable    Date/Time   Wed Oct 23, 2024 11:17 AM    Comment   Rociocon Garcia discharge to home/self care.                   Assessment & Plan       Medical Decision Making  Differential diagnosis includes COVID, flu, pneumonia, strep, viral syndrome.  Labs reviewed and unremarkable.  COVID flu and strep are negative.  I ordered a chest x-ray.  I independently interpreted as possible left lower lobe infiltrate as there is some blunting of the left cardiac border.  Pulse ox 96% on room air indicating adequate oxygenation.  Patient is nontoxic-appearing.  Will treat with amoxicillin 1 p.o. 3 times daily x 5 days for possible community-acquired pneumonia.  Advised Tylenol or Motrin for fever.  Advise follow-up with her primary care provider next 48 hours if no improvement.  Return precautions reviewed    Amount and/or Complexity of Data Reviewed  Labs: ordered.  Radiology: ordered.    Risk  Prescription drug management.             Medications   sodium chloride 0.9 % bolus 1,000 mL (0 mL Intravenous Stopped 10/23/24 1125)   amoxicillin (AMOXIL) capsule 1,000 mg (1,000 mg Oral Given 10/23/24 1122)       ED Risk Strat Scores                                               History of Present Illness       Chief Complaint   Patient presents with    Cough     Pt reports of sob, was febrile with a cough started Sunday. Pt also had chills with gen body aches, fatigue       Past Medical History:   Diagnosis Date    Gastritis     SVT (supraventricular tachycardia) (HCC)     V-tach (HCC)       Past Surgical History:   Procedure Laterality Date    ABDOMINAL SURGERY      colon sx (adnoma removed)    APPENDECTOMY      CARDIAC SURGERY      ablasion  09/2015 (enrrique wood jace)    CHOLECYSTECTOMY        Family History   Problem Relation Age of Onset    No Known Problems Mother     No Known Problems Father       Social History     Tobacco Use    Smoking status: Former     Types: Cigarettes     Passive exposure: Past    Smokeless tobacco: Never   Vaping Use    Vaping status: Never Used   Substance Use Topics    Alcohol use: Yes    Drug use: Not Currently      E-Cigarette/Vaping    E-Cigarette Use Never User       E-Cigarette/Vaping Substances      I have reviewed and agree with the history as documented.     Patient is a 53-year-old white female with history of V. tach, SVT, cardiac ablation reports onset 3 days ago of scratchy throat, fever 1-1.4, body aches, dry hacking cough, chills.  Seen at the urgent care 2 days ago.  Had a negative COVID and flu swab.  Had a negative strep swab.  Reports continued cough.  States when she lies on her left side she notices a crackling sensation in her left upper chest.  No shortness of breath or wheezing.  No other complaints        Review of Systems   Constitutional:  Positive for fever. Negative for chills.   HENT:  Positive for sore throat. Negative for congestion and rhinorrhea.    Respiratory:  Positive for cough. Negative for shortness of breath and wheezing.    Cardiovascular:  Negative for chest pain.   Gastrointestinal:  Negative for abdominal pain, diarrhea, nausea and vomiting.   Musculoskeletal:  Positive for neck pain.   Neurological:  Negative for headaches.           Objective       ED Triage Vitals [10/23/24 0928]   Temperature Pulse Blood Pressure Respirations SpO2 Patient Position - Orthostatic VS   99.7 °F (37.6 °C) 97 147/94 20 96 % --      Temp Source Heart Rate Source BP Location FiO2 (%) Pain Score    Oral Monitor -- -- --      Vitals      Date and Time Temp Pulse SpO2 Resp BP Pain Score FACES Pain Rating User   10/23/24 0928 99.7 °F (37.6 °C) 97 96 % 20 147/94 -- -- KATHLEEN            Physical  Exam  Vitals and nursing note reviewed.   Constitutional:       General: She is not in acute distress.     Appearance: Normal appearance. She is not ill-appearing, toxic-appearing or diaphoretic.   HENT:      Head: Normocephalic and atraumatic.      Right Ear: Tympanic membrane, ear canal and external ear normal.      Left Ear: Tympanic membrane, ear canal and external ear normal.      Nose: Nose normal.      Mouth/Throat:      Mouth: Mucous membranes are moist.      Pharynx: Oropharynx is clear.   Cardiovascular:      Rate and Rhythm: Normal rate and regular rhythm.      Pulses: Normal pulses.      Heart sounds: Normal heart sounds.   Pulmonary:      Effort: Pulmonary effort is normal.      Breath sounds: No wheezing.      Comments: Crackles left midlung.  Chest:      Chest wall: No tenderness.   Abdominal:      General: Abdomen is flat. Bowel sounds are normal.      Palpations: Abdomen is soft.   Musculoskeletal:         General: Normal range of motion.      Cervical back: Normal range of motion and neck supple.   Skin:     General: Skin is warm and dry.      Capillary Refill: Capillary refill takes less than 2 seconds.   Neurological:      General: No focal deficit present.      Mental Status: She is alert and oriented to person, place, and time. Mental status is at baseline.         Results Reviewed       Procedure Component Value Units Date/Time    Strep A PCR [[id number]]  (Normal) Collected: 10/23/24 1006    Lab Status: Final result Specimen: Throat Updated: 10/23/24 1048     STREP A PCR Not Detected    FLU/COVID Rapid Antigen (30 min. TAT) - Preferred screening test in ED [[id number]]  (Normal) Collected: 10/23/24 1006    Lab Status: Final result Specimen: Nares from Nose Updated: 10/23/24 1045     SARS COV Rapid Antigen Negative     Influenza A Rapid Antigen Negative     Influenza B Rapid Antigen Negative    Narrative:      This test has been performed using the [employer] 2 FLU+SARS Antigen test under the  Emergency Use Authorization (EUA). This test has been validated by the  and verified by the performing laboratory. The Yady uses lateral flow immunofluorescent sandwich assay to detect SARS-COV, Influenza A and Influenza B Antigen.     The [employer] 2 SARS Antigen test does not differentiate between SARS-CoV and SARS-CoV-2.     Negative results are presumptive and may be confirmed with a molecular assay, if necessary, for patient management. Negative results do not rule out SARS-CoV-2 or influenza infection and should not be used as the sole basis for treatment or patient management decisions. A negative test result may occur if the level of antigen in a sample is below the limit of detection of this test.     Positive results are indicative of the presence of viral antigens, but do not rule out bacterial infection or co-infection with other viruses.     All test results should be used as an adjunct to clinical observations and other information available to the provider.    FOR PEDIATRIC PATIENTS - copy/paste COVID Guidelines URL to browser: [email or web address]    Procalcitonin [[id number]]  (Normal) Collected: 10/23/24 1006    Lab Status: Final result Specimen: Blood from Arm, Left Updated: 10/23/24 1043     Procalcitonin <0.05 ng/ml     Comprehensive metabolic panel [[id number]] Collected: 10/23/24 1006    Lab Status: Final result Specimen: Blood from Arm, Left Updated: 10/23/24 1035     Sodium 137 mmol/L      Potassium 3.7 mmol/L      Chloride 103 mmol/L      CO2 24 mmol/L      ANION GAP 10 mmol/L      BUN 9 mg/dL      Creatinine 0.68 mg/dL      Glucose 99 mg/dL      Calcium 8.6 mg/dL      AST 24 U/L      ALT 44 U/L      Alkaline Phosphatase 89 U/L      Total Protein 7.5 g/dL      Albumin 4.2 g/dL      Total Bilirubin 0.34 mg/dL      eGFR 98 ml/min/1.73sq m     Narrative:      National Kidney Disease Foundation guidelines for Chronic Kidney  Disease (CKD):     Stage 1 with normal or high GFR (GFR > 90 mL/min/1.73 square meters)    Stage 2 Mild CKD (GFR = 60-89 mL/min/1.73 square meters)    Stage 3A Moderate CKD (GFR = 45-59 mL/min/1.73 square meters)    Stage 3B Moderate CKD (GFR = 30-44 mL/min/1.73 square meters)    Stage 4 Severe CKD (GFR = 15-29 mL/min/1.73 square meters)    Stage 5 End Stage CKD (GFR <15 mL/min/1.73 square meters)  Note: GFR calculation is accurate only with a steady state creatinine    Lactic acid [[id number]]  (Normal) Collected: 10/23/24 1006    Lab Status: Final result Specimen: Blood from Arm, Left Updated: 10/23/24 1035     LACTIC ACID 1.7 mmol/L     Narrative:      Result may be elevated if tourniquet was used during collection.    Blood culture #2 [[id number]] Collected: 10/23/24 1028    Lab Status: In process Specimen: Blood from Arm, Right Updated: 10/23/24 1032    Protime-INR [[id number]]  (Normal) Collected: 10/23/24 1006    Lab Status: Final result Specimen: Blood from Arm, Left Updated: 10/23/24 1028     Protime 12.4 seconds      INR 0.87    Narrative:      INR Therapeutic Range    Indication                                             INR Range      Atrial Fibrillation                                               2.0-3.0  Hypercoagulable State                                    2.0.2.3  Left Ventricular Asist Device                            2.0-3.0  Mechanical Heart Valve                                  -    Aortic(with afib, MI, embolism, HF, LA enlargement,    and/or coagulopathy)                                     2.0-3.0 (2.5-3.5)     Mitral                                                             2.5-3.5  Prosthetic/Bioprosthetic Heart Valve               2.0-3.0  Venous thromboembolism (VTE: VT, PE        2.0-3.0    APTT [[id number]]  (Normal) Collected: 10/23/24 1006    Lab Status: Final result Specimen: Blood from Arm, Left Updated: 10/23/24 1028     PTT 27 seconds     CBC and differential [[id number]]   (Abnormal) Collected: 10/23/24 1006    Lab Status: Final result Specimen: Blood from Arm, Left Updated: 10/23/24 1016     WBC 5.91 Thousand/uL      RBC 4.37 Million/uL      Hemoglobin 13.0 g/dL      Hematocrit 40.4 %      MCV 92 fL      MCH 29.7 pg      MCHC 32.2 g/dL      RDW 13.1 %      MPV 9.7 fL      Platelets 270 Thousands/uL      nRBC 0 /100 WBCs      Segmented % 54 %      Immature Grans % 0 %      Lymphocytes % 26 %      Monocytes % 16 %      Eosinophils Relative 3 %      Basophils Relative 1 %      Absolute Neutrophils 3.24 Thousands/µL      Absolute Immature Grans 0.01 Thousand/uL      Absolute Lymphocytes 1.52 Thousands/µL      Absolute Monocytes 0.93 Thousand/µL      Eosinophils Absolute 0.16 Thousand/µL      Basophils Absolute 0.05 Thousands/µL     Blood culture #1 [[id number]] Collected: 10/23/24 1006    Lab Status: In process Specimen: Blood from Arm, Left Updated: 10/23/24 1013            XR chest 1 view portable   Final Interpretation by Porter Jordan MD (10/23 1124)      No acute cardiopulmonary disease.            Workstation performed: [id number]             Procedures    ED Medication and Procedure Management   Prior to Admission Medications   Prescriptions Last Dose Informant Patient Reported? Taking?   Ascorbic Acid (vitamin C) 1000 MG tablet  Self Yes No   Sig: Take 1,000 mg by mouth daily   Patient not taking: Reported on 10/21/2024   Calcium Carb-Cholecalciferol (Calcium 1000 + D) 1000-800 MG-UNIT TABS   Yes No   Sig: Take by mouth   Patient not taking: Reported on 10/21/2024   Lidocaine-Hydrocortisone Ace 1-1 % CREA   No No   Sig: Apply 1 application topically 2 (two) times a day   Patient not taking: Reported on 4/19/2024   Probiotic Product (Probiotic + Omega-3) CAPS   Yes No   Sig: Take by mouth   Patient not taking: Reported on 10/21/2024   cholecalciferol (VITAMIN D3) 1,000 units tablet   Yes No   Sig: Take by mouth daily   Patient not taking: Reported on 10/21/2024   co-enzyme Q-10  30 MG capsule   Yes No   Sig: Take 30 mg by mouth 3 (three) times a day   Patient not taking: Reported on 10/21/2024   magnesium 30 MG tablet   Yes No   Sig: Take 30 mg by mouth 2 (two) times a day Calcium/Magnesium/Zinc Supplement   Patient not taking: Reported on 10/21/2024   naproxen (NAPROSYN) 500 mg tablet   No No   Sig: Take 1 tablet (500 mg total) by mouth 2 (two) times a day with meals   Patient not taking: Reported on 3/23/2021   pantoprazole (PROTONIX) 40 mg tablet   No No   Sig: Take 1 tablet (40 mg total) by mouth daily   Patient not taking: Reported on 3/23/2021   vitamin E, tocopherol, 400 units capsule   Yes No   Sig: Take 400 Units by mouth daily   Patient not taking: Reported on 10/21/2024   zinc gluconate 50 mg tablet   Yes No   Sig: Take 25 mg by mouth daily   Patient not taking: Reported on 10/21/2024      Facility-Administered Medications: None     Discharge Medication List as of 10/23/2024 11:19 AM        START taking these medications    Details   amoxicillin (AMOXIL) 500 mg capsule Take 2 capsules (1,000 mg total) by mouth every 8 (eight) hours for 5 days, Starting Wed 10/23/2024, Until Mon 10/28/2024, Normal      benzonatate (TESSALON PERLES) 100 mg capsule Take 1 capsule (100 mg total) by mouth every 8 (eight) hours, Starting Wed 10/23/2024, Normal           CONTINUE these medications which have NOT CHANGED    Details   Ascorbic Acid (vitamin C) 1000 MG tablet Take 1,000 mg by mouth daily, Historical Med      Calcium Carb-Cholecalciferol (Calcium 1000 + D) 1000-800 MG-UNIT TABS Take by mouth, Historical Med      cholecalciferol (VITAMIN D3) 1,000 units tablet Take by mouth daily, Historical Med      co-enzyme Q-10 30 MG capsule Take 30 mg by mouth 3 (three) times a day, Historical Med      Lidocaine-Hydrocortisone Ace 1-1 % CREA Apply 1 application topically 2 (two) times a day, Starting Sat 12/4/2021, Normal      magnesium 30 MG tablet Take 30 mg by mouth 2 (two) times a day  Calcium/Magnesium/Zinc Supplement, Historical Med      naproxen (NAPROSYN) 500 mg tablet Take 1 tablet (500 mg total) by mouth 2 (two) times a day with meals, Starting Mon 9/28/2020, Normal      pantoprazole (PROTONIX) 40 mg tablet Take 1 tablet (40 mg total) by mouth daily, Starting Mon 12/28/2020, Normal      Probiotic Product (Probiotic + Omega-3) CAPS Take by mouth, Historical Med      vitamin E, tocopherol, 400 units capsule Take 400 Units by mouth daily, Historical Med      zinc gluconate 50 mg tablet Take 25 mg by mouth daily, Historical Med           No discharge procedures on file.  ED SEPSIS DOCUMENTATION   Time reflects when diagnosis was documented in both MDM as applicable and the Disposition within this note       Time User Action Codes Description Comment    10/23/2024 11:17 AM Malachi Vasques Add [J18.9] Pneumonia                  Malachi Vasques PA-C  10/23/24 2419     Alert and oriented to person, place and time

## 2024-10-23 NOTE — Clinical Note
Rocio Garcia was seen and treated in our emergency department on 10/23/2024.                Diagnosis:     Rocio  .    She may return on this date:     Rocio Garcia is excused from work through Friday October 25th     If you have any questions or concerns, please don't hesitate to call.      Malachi Vasques PA-C    ______________________________           _______________          _______________  Hospital Representative                              Date                                Time

## 2024-10-23 NOTE — DISCHARGE INSTRUCTIONS
Amoxicillin 1 g tid x 5 days    Tessalon perles for cough    Follow up with your primary care provider - Dr Aguilar- if no improvement next 2 days    Return to ED for increased work of breathing, worsening symptoms

## 2024-10-27 LAB
BACTERIA BLD CULT: NORMAL
BACTERIA BLD CULT: NORMAL

## 2024-10-28 LAB
BACTERIA BLD CULT: NORMAL
BACTERIA BLD CULT: NORMAL

## 2024-12-20 ENCOUNTER — APPOINTMENT (EMERGENCY)
Dept: RADIOLOGY | Facility: HOSPITAL | Age: 54
End: 2024-12-20
Payer: COMMERCIAL

## 2024-12-20 ENCOUNTER — APPOINTMENT (OUTPATIENT)
Dept: RADIOLOGY | Facility: HOSPITAL | Age: 54
End: 2024-12-20
Payer: COMMERCIAL

## 2024-12-20 ENCOUNTER — HOSPITAL ENCOUNTER (OUTPATIENT)
Facility: HOSPITAL | Age: 54
Setting detail: OBSERVATION
Discharge: HOME | End: 2024-12-21
Attending: EMERGENCY MEDICINE | Admitting: INTERNAL MEDICINE
Payer: COMMERCIAL

## 2024-12-20 DIAGNOSIS — B33.8 RSV INFECTION: ICD-10-CM

## 2024-12-20 DIAGNOSIS — R47.89 WORD FINDING DIFFICULTY: Primary | ICD-10-CM

## 2024-12-20 DIAGNOSIS — R19.7 NAUSEA VOMITING AND DIARRHEA: ICD-10-CM

## 2024-12-20 DIAGNOSIS — R11.2 NAUSEA VOMITING AND DIARRHEA: ICD-10-CM

## 2024-12-20 PROBLEM — R74.8 ELEVATED LIPASE: Status: ACTIVE | Noted: 2024-12-20

## 2024-12-20 PROBLEM — K52.9 GASTROENTERITIS: Status: ACTIVE | Noted: 2024-12-20

## 2024-12-20 PROBLEM — J01.90 ACUTE SINUSITIS: Status: ACTIVE | Noted: 2024-12-20

## 2024-12-20 PROBLEM — A41.9 SEPSIS (HCC): Status: ACTIVE | Noted: 2024-12-20

## 2024-12-20 PROBLEM — R51.9 OCCIPITAL PAIN: Status: ACTIVE | Noted: 2024-12-20

## 2024-12-20 PROBLEM — E87.1 HYPONATREMIA: Status: ACTIVE | Noted: 2024-12-20

## 2024-12-20 PROBLEM — R93.0 ABNORMAL CT OF THE HEAD: Status: ACTIVE | Noted: 2024-12-20

## 2024-12-20 PROBLEM — R93.89 ABNORMAL COMPUTED TOMOGRAPHY ANGIOGRAPHY (CTA) OF NECK: Status: ACTIVE | Noted: 2024-12-20

## 2024-12-20 PROBLEM — K29.00 ACUTE GASTRITIS WITHOUT HEMORRHAGE: Status: ACTIVE | Noted: 2024-12-20

## 2024-12-20 PROBLEM — R47.01 EXPRESSIVE APHASIA: Status: ACTIVE | Noted: 2024-12-20

## 2024-12-20 LAB
2HR DELTA HS TROPONIN: 1 NG/L
4HR DELTA HS TROPONIN: 1 NG/L
ALBUMIN SERPL BCG-MCNC: 3.5 G/DL (ref 3.5–5)
ALP SERPL-CCNC: 67 U/L (ref 34–104)
ALT SERPL W P-5'-P-CCNC: 61 U/L (ref 7–52)
ANION GAP SERPL CALCULATED.3IONS-SCNC: 8 MMOL/L (ref 4–13)
APTT PPP: 21 SECONDS (ref 23–34)
AST SERPL W P-5'-P-CCNC: 31 U/L (ref 13–39)
BILIRUB DIRECT SERPL-MCNC: 0.07 MG/DL (ref 0–0.2)
BILIRUB SERPL-MCNC: 0.43 MG/DL (ref 0.2–1)
BUN SERPL-MCNC: 22 MG/DL (ref 5–25)
CALCIUM SERPL-MCNC: 7.3 MG/DL (ref 8.4–10.2)
CARDIAC TROPONIN I PNL SERPL HS: 3 NG/L (ref ?–50)
CARDIAC TROPONIN I PNL SERPL HS: 4 NG/L (ref ?–50)
CARDIAC TROPONIN I PNL SERPL HS: 4 NG/L (ref ?–50)
CHLORIDE SERPL-SCNC: 102 MMOL/L (ref 96–108)
CO2 SERPL-SCNC: 18 MMOL/L (ref 21–32)
CREAT SERPL-MCNC: 0.53 MG/DL (ref 0.6–1.3)
ERYTHROCYTE [DISTWIDTH] IN BLOOD BY AUTOMATED COUNT: 13.7 % (ref 11.6–15.1)
GFR SERPL CREATININE-BSD FRML MDRD: 106 ML/MIN/1.73SQ M
GLUCOSE SERPL-MCNC: 102 MG/DL (ref 65–140)
GLUCOSE SERPL-MCNC: 115 MG/DL (ref 65–140)
HCT VFR BLD AUTO: 45.8 % (ref 34.8–46.1)
HGB BLD-MCNC: 15.2 G/DL (ref 11.5–15.4)
INR PPP: 0.9 (ref 0.85–1.19)
LACTATE SERPL-SCNC: 1.2 MMOL/L (ref 0.5–2)
LIPASE SERPL-CCNC: 362 U/L (ref 11–82)
MCH RBC QN AUTO: 30.1 PG (ref 26.8–34.3)
MCHC RBC AUTO-ENTMCNC: 33.2 G/DL (ref 31.4–37.4)
MCV RBC AUTO: 91 FL (ref 82–98)
PLATELET # BLD AUTO: 331 THOUSANDS/UL (ref 149–390)
PMV BLD AUTO: 9.4 FL (ref 8.9–12.7)
POTASSIUM SERPL-SCNC: 4.2 MMOL/L (ref 3.5–5.3)
PROCALCITONIN SERPL-MCNC: 0.29 NG/ML
PROT SERPL-MCNC: 6 G/DL (ref 6.4–8.4)
PROTHROMBIN TIME: 12.6 SECONDS (ref 12.3–15)
RBC # BLD AUTO: 5.05 MILLION/UL (ref 3.81–5.12)
SODIUM SERPL-SCNC: 128 MMOL/L (ref 135–147)
TSH SERPL DL<=0.05 MIU/L-ACNC: 2.27 UIU/ML (ref 0.45–4.5)
WBC # BLD AUTO: 16.43 THOUSAND/UL (ref 4.31–10.16)

## 2024-12-20 PROCEDURE — 82607 VITAMIN B-12: CPT

## 2024-12-20 PROCEDURE — 84484 ASSAY OF TROPONIN QUANT: CPT | Performed by: EMERGENCY MEDICINE

## 2024-12-20 PROCEDURE — 93005 ELECTROCARDIOGRAM TRACING: CPT

## 2024-12-20 PROCEDURE — 83605 ASSAY OF LACTIC ACID: CPT

## 2024-12-20 PROCEDURE — 36415 COLL VENOUS BLD VENIPUNCTURE: CPT | Performed by: EMERGENCY MEDICINE

## 2024-12-20 PROCEDURE — 93971 EXTREMITY STUDY: CPT | Performed by: SURGERY

## 2024-12-20 PROCEDURE — 84484 ASSAY OF TROPONIN QUANT: CPT

## 2024-12-20 PROCEDURE — 80048 BASIC METABOLIC PNL TOTAL CA: CPT | Performed by: EMERGENCY MEDICINE

## 2024-12-20 PROCEDURE — 87040 BLOOD CULTURE FOR BACTERIA: CPT

## 2024-12-20 PROCEDURE — 71260 CT THORAX DX C+: CPT

## 2024-12-20 PROCEDURE — 99285 EMERGENCY DEPT VISIT HI MDM: CPT

## 2024-12-20 PROCEDURE — 99285 EMERGENCY DEPT VISIT HI MDM: CPT | Performed by: PSYCHIATRY & NEUROLOGY

## 2024-12-20 PROCEDURE — 70498 CT ANGIOGRAPHY NECK: CPT

## 2024-12-20 PROCEDURE — 80076 HEPATIC FUNCTION PANEL: CPT

## 2024-12-20 PROCEDURE — 87081 CULTURE SCREEN ONLY: CPT | Performed by: INTERNAL MEDICINE

## 2024-12-20 PROCEDURE — 85027 COMPLETE CBC AUTOMATED: CPT | Performed by: EMERGENCY MEDICINE

## 2024-12-20 PROCEDURE — 85730 THROMBOPLASTIN TIME PARTIAL: CPT | Performed by: EMERGENCY MEDICINE

## 2024-12-20 PROCEDURE — 93971 EXTREMITY STUDY: CPT

## 2024-12-20 PROCEDURE — 96374 THER/PROPH/DIAG INJ IV PUSH: CPT

## 2024-12-20 PROCEDURE — 96361 HYDRATE IV INFUSION ADD-ON: CPT

## 2024-12-20 PROCEDURE — 87493 C DIFF AMPLIFIED PROBE: CPT | Performed by: EMERGENCY MEDICINE

## 2024-12-20 PROCEDURE — 74177 CT ABD & PELVIS W/CONTRAST: CPT

## 2024-12-20 PROCEDURE — 99223 1ST HOSP IP/OBS HIGH 75: CPT | Performed by: INTERNAL MEDICINE

## 2024-12-20 PROCEDURE — NC001 PR NO CHARGE: Performed by: PSYCHIATRY & NEUROLOGY

## 2024-12-20 PROCEDURE — 99285 EMERGENCY DEPT VISIT HI MDM: CPT | Performed by: EMERGENCY MEDICINE

## 2024-12-20 PROCEDURE — 84145 PROCALCITONIN (PCT): CPT

## 2024-12-20 PROCEDURE — 96375 TX/PRO/DX INJ NEW DRUG ADDON: CPT

## 2024-12-20 PROCEDURE — 83690 ASSAY OF LIPASE: CPT | Performed by: EMERGENCY MEDICINE

## 2024-12-20 PROCEDURE — 70496 CT ANGIOGRAPHY HEAD: CPT

## 2024-12-20 PROCEDURE — 82948 REAGENT STRIP/BLOOD GLUCOSE: CPT

## 2024-12-20 PROCEDURE — 85610 PROTHROMBIN TIME: CPT | Performed by: EMERGENCY MEDICINE

## 2024-12-20 PROCEDURE — 84443 ASSAY THYROID STIM HORMONE: CPT

## 2024-12-20 RX ORDER — ATORVASTATIN CALCIUM 40 MG/1
40 TABLET, FILM COATED ORAL EVERY EVENING
Status: DISCONTINUED | OUTPATIENT
Start: 2024-12-20 | End: 2024-12-21 | Stop reason: HOSPADM

## 2024-12-20 RX ORDER — ALBUTEROL SULFATE 90 UG/1
1 INHALANT RESPIRATORY (INHALATION) EVERY 6 HOURS PRN
Status: DISCONTINUED | OUTPATIENT
Start: 2024-12-20 | End: 2024-12-21 | Stop reason: HOSPADM

## 2024-12-20 RX ORDER — ENOXAPARIN SODIUM 100 MG/ML
40 INJECTION SUBCUTANEOUS DAILY
Status: DISCONTINUED | OUTPATIENT
Start: 2024-12-20 | End: 2024-12-21 | Stop reason: HOSPADM

## 2024-12-20 RX ORDER — ASPIRIN 81 MG/1
81 TABLET, CHEWABLE ORAL DAILY
Status: DISCONTINUED | OUTPATIENT
Start: 2024-12-21 | End: 2024-12-21

## 2024-12-20 RX ORDER — ASPIRIN 325 MG
325 TABLET ORAL ONCE
Status: DISCONTINUED | OUTPATIENT
Start: 2024-12-20 | End: 2024-12-21

## 2024-12-20 RX ORDER — KETOROLAC TROMETHAMINE 30 MG/ML
30 INJECTION, SOLUTION INTRAMUSCULAR; INTRAVENOUS EVERY 8 HOURS
Status: DISCONTINUED | OUTPATIENT
Start: 2024-12-20 | End: 2024-12-20

## 2024-12-20 RX ORDER — KETOROLAC TROMETHAMINE 30 MG/ML
15 INJECTION, SOLUTION INTRAMUSCULAR; INTRAVENOUS EVERY 6 HOURS PRN
Status: DISCONTINUED | OUTPATIENT
Start: 2024-12-20 | End: 2024-12-20

## 2024-12-20 RX ORDER — IPRATROPIUM BROMIDE 21 UG/1
2 SPRAY, METERED NASAL 3 TIMES DAILY
Status: DISCONTINUED | OUTPATIENT
Start: 2024-12-20 | End: 2024-12-20

## 2024-12-20 RX ORDER — SIMETHICONE 80 MG
80 TABLET,CHEWABLE ORAL 4 TIMES DAILY PRN
Status: DISCONTINUED | OUTPATIENT
Start: 2024-12-20 | End: 2024-12-21 | Stop reason: HOSPADM

## 2024-12-20 RX ORDER — ACETAMINOPHEN 10 MG/ML
1000 INJECTION, SOLUTION INTRAVENOUS EVERY 6 HOURS PRN
Status: DISCONTINUED | OUTPATIENT
Start: 2024-12-20 | End: 2024-12-20

## 2024-12-20 RX ORDER — CLOPIDOGREL BISULFATE 75 MG/1
300 TABLET ORAL ONCE
Status: COMPLETED | OUTPATIENT
Start: 2024-12-20 | End: 2024-12-20

## 2024-12-20 RX ORDER — ONDANSETRON 2 MG/ML
4 INJECTION INTRAMUSCULAR; INTRAVENOUS ONCE
Status: COMPLETED | OUTPATIENT
Start: 2024-12-20 | End: 2024-12-20

## 2024-12-20 RX ORDER — CEFTRIAXONE 1 G/50ML
1000 INJECTION, SOLUTION INTRAVENOUS EVERY 24 HOURS
Status: DISCONTINUED | OUTPATIENT
Start: 2024-12-20 | End: 2024-12-21

## 2024-12-20 RX ORDER — IPRATROPIUM BROMIDE 21 UG/1
2 SPRAY, METERED NASAL 3 TIMES DAILY
COMMUNITY
Start: 2024-12-14 | End: 2024-12-29

## 2024-12-20 RX ORDER — METHYLPREDNISOLONE 4 MG/1
4 TABLET ORAL DAILY
COMMUNITY
Start: 2024-12-14 | End: 2024-12-21

## 2024-12-20 RX ORDER — KETOROLAC TROMETHAMINE 30 MG/ML
30 INJECTION, SOLUTION INTRAMUSCULAR; INTRAVENOUS EVERY 6 HOURS PRN
Status: DISCONTINUED | OUTPATIENT
Start: 2024-12-20 | End: 2024-12-21 | Stop reason: HOSPADM

## 2024-12-20 RX ORDER — METOCLOPRAMIDE HYDROCHLORIDE 5 MG/ML
10 INJECTION INTRAMUSCULAR; INTRAVENOUS EVERY 8 HOURS
Status: DISCONTINUED | OUTPATIENT
Start: 2024-12-20 | End: 2024-12-21 | Stop reason: HOSPADM

## 2024-12-20 RX ORDER — SODIUM CHLORIDE 9 MG/ML
125 INJECTION, SOLUTION INTRAVENOUS CONTINUOUS
Status: DISCONTINUED | OUTPATIENT
Start: 2024-12-20 | End: 2024-12-21 | Stop reason: HOSPADM

## 2024-12-20 RX ORDER — METOCLOPRAMIDE HYDROCHLORIDE 5 MG/ML
10 INJECTION INTRAMUSCULAR; INTRAVENOUS EVERY 8 HOURS
Status: DISCONTINUED | OUTPATIENT
Start: 2024-12-20 | End: 2024-12-20

## 2024-12-20 RX ORDER — KETOROLAC TROMETHAMINE 30 MG/ML
15 INJECTION, SOLUTION INTRAMUSCULAR; INTRAVENOUS ONCE
Status: COMPLETED | OUTPATIENT
Start: 2024-12-20 | End: 2024-12-20

## 2024-12-20 RX ORDER — METOCLOPRAMIDE HYDROCHLORIDE 5 MG/ML
10 INJECTION INTRAMUSCULAR; INTRAVENOUS ONCE
Status: COMPLETED | OUTPATIENT
Start: 2024-12-20 | End: 2024-12-20

## 2024-12-20 RX ORDER — FAMOTIDINE 20 MG/1
20 TABLET, FILM COATED ORAL 2 TIMES DAILY
Status: DISCONTINUED | OUTPATIENT
Start: 2024-12-20 | End: 2024-12-21 | Stop reason: HOSPADM

## 2024-12-20 RX ORDER — ALBUTEROL SULFATE 90 UG/1
1 INHALANT RESPIRATORY (INHALATION) EVERY 6 HOURS PRN
COMMUNITY
Start: 2024-12-17

## 2024-12-20 RX ORDER — ONDANSETRON 2 MG/ML
4 INJECTION INTRAMUSCULAR; INTRAVENOUS EVERY 6 HOURS PRN
Status: DISCONTINUED | OUTPATIENT
Start: 2024-12-20 | End: 2024-12-21 | Stop reason: HOSPADM

## 2024-12-20 RX ORDER — ACETAMINOPHEN 10 MG/ML
1000 INJECTION, SOLUTION INTRAVENOUS ONCE
Status: COMPLETED | OUTPATIENT
Start: 2024-12-20 | End: 2024-12-20

## 2024-12-20 RX ORDER — ACETAMINOPHEN 325 MG/1
975 TABLET ORAL EVERY 8 HOURS SCHEDULED
Status: DISCONTINUED | OUTPATIENT
Start: 2024-12-21 | End: 2024-12-21 | Stop reason: HOSPADM

## 2024-12-20 RX ORDER — KETOROLAC TROMETHAMINE 30 MG/ML
30 INJECTION, SOLUTION INTRAMUSCULAR; INTRAVENOUS EVERY 6 HOURS PRN
Status: DISCONTINUED | OUTPATIENT
Start: 2024-12-20 | End: 2024-12-20

## 2024-12-20 RX ORDER — FLUTICASONE PROPIONATE 50 MCG
1 SPRAY, SUSPENSION (ML) NASAL DAILY
Status: DISCONTINUED | OUTPATIENT
Start: 2024-12-20 | End: 2024-12-21 | Stop reason: HOSPADM

## 2024-12-20 RX ORDER — SACCHAROMYCES BOULARDII 250 MG
250 CAPSULE ORAL 2 TIMES DAILY
Status: DISCONTINUED | OUTPATIENT
Start: 2024-12-20 | End: 2024-12-21 | Stop reason: HOSPADM

## 2024-12-20 RX ORDER — KETOROLAC TROMETHAMINE 30 MG/ML
15 INJECTION, SOLUTION INTRAMUSCULAR; INTRAVENOUS EVERY 6 HOURS PRN
Status: DISCONTINUED | OUTPATIENT
Start: 2024-12-20 | End: 2024-12-21 | Stop reason: HOSPADM

## 2024-12-20 RX ADMIN — FAMOTIDINE 20 MG: 20 TABLET, FILM COATED ORAL at 18:16

## 2024-12-20 RX ADMIN — SODIUM CHLORIDE 125 ML/HR: 0.9 INJECTION, SOLUTION INTRAVENOUS at 13:02

## 2024-12-20 RX ADMIN — ACETAMINOPHEN 1000 MG: 10 INJECTION INTRAVENOUS at 07:58

## 2024-12-20 RX ADMIN — IOHEXOL 100 ML: 350 INJECTION, SOLUTION INTRAVENOUS at 07:29

## 2024-12-20 RX ADMIN — SODIUM CHLORIDE 125 ML/HR: 0.9 INJECTION, SOLUTION INTRAVENOUS at 20:10

## 2024-12-20 RX ADMIN — METOCLOPRAMIDE 10 MG: 5 INJECTION, SOLUTION INTRAMUSCULAR; INTRAVENOUS at 18:16

## 2024-12-20 RX ADMIN — FLUTICASONE PROPIONATE 1 SPRAY: 50 SPRAY, METERED NASAL at 14:17

## 2024-12-20 RX ADMIN — ACETAMINOPHEN 1000 MG: 1000 INJECTION, SOLUTION INTRAVENOUS at 20:00

## 2024-12-20 RX ADMIN — METOCLOPRAMIDE 10 MG: 5 INJECTION, SOLUTION INTRAMUSCULAR; INTRAVENOUS at 07:57

## 2024-12-20 RX ADMIN — KETOROLAC TROMETHAMINE 15 MG: 30 INJECTION, SOLUTION INTRAMUSCULAR; INTRAVENOUS at 07:57

## 2024-12-20 RX ADMIN — CLOPIDOGREL 300 MG: 75 TABLET ORAL at 14:16

## 2024-12-20 RX ADMIN — SODIUM CHLORIDE 1000 ML: 0.9 INJECTION, SOLUTION INTRAVENOUS at 08:02

## 2024-12-20 RX ADMIN — ONDANSETRON 4 MG: 2 INJECTION INTRAMUSCULAR; INTRAVENOUS at 09:30

## 2024-12-20 RX ADMIN — CEFTRIAXONE 1000 MG: 1 INJECTION, SOLUTION INTRAVENOUS at 14:12

## 2024-12-20 RX ADMIN — FAMOTIDINE 20 MG: 20 TABLET, FILM COATED ORAL at 12:50

## 2024-12-20 RX ADMIN — SODIUM CHLORIDE 500 ML: 0.9 INJECTION, SOLUTION INTRAVENOUS at 12:50

## 2024-12-20 NOTE — H&P
H&P - [facility]   Name: Rocio Garcia 54 y.o. female I MRN: [id number]  Unit/Bed#: ICU 08 I Date of Admission: 12/20/2024   Date of Service: 12/20/2024 I Hospital Day: 0     Assessment & Plan  Sepsis (HCC)  POA: met sepsis criteria with tachypnea, tachycardia, WBC 16.43  PT-INR WNL and PTT 21 (low), Troponins WNL, LA WNL  Labs notable for: Lipase 362, ALT 61  Could be 2/2 recent RSV infection, dehydration from diarrhea, or sinusitis.    Pending: c.diff cx, lipid panel, Hgb A1C, MRSA cx, blood cx x2, procalcitonin     ED course: given 325 mg ASA, Plavix 300 mg, 1L NS, toradol 15 mg x1, reglan 10 mg x1, zofran 4 mg x1    CT Stroke alert brain: No acute intracranial abnormality. Multifocal sinusitis suspected.  CTA Stroke: No large vessel occlusion or high-grade stenosis. Abnormal enhancement of the right jugular bulb and upper internal jugular vein is favored to reflect mixing artifact. Consider follow-up ultrasound to exclude underlying thrombus.  CT chest/abdomen/pelvis: No acute abnormality in the chest. Findings suggestive of nonspecific gastroenteritis.  VAS upper limb duplex: no evidence of thrombosis or thrombophlebitis    EKG: Sinus Tachy    Neurology consulted, appreciate recommendations  Most likely d/t recent dehydration with MRI brain pending - if MRI negative then d/c Plavix  Pt declines migraine cocktail or Fioricet for prevention.  Pt would like to continue IVF.  Ordered carotid usg.  Continuing IVF  Started on IV Rocephin to cover for possible sinusitis.  Discontinued Carotid USG based on benign CTA findings.   Gastroenteritis  Endorses h/o 2 days N/V/D and notes that she alone ate Chinese food prior to symptoms starting. She developed intense abdominal pain at 2:30 AM on day of admission and after several bouts of vomiting had residual substernal and epigastric CP which has since been resolving. She also was taking Augmentin and was wondering if she might have c.diff.    Pending c.diff cx and blood  "cx  Continue IVF  Pain regimen: Tylenol 1g mild, Toradol 15 mg moderate, Toradol 30 mg severe  RSV infection  Was diagnosed with RSV earlier this week. Has been trying symptomatic care along with the following regimen: Medrol Dosepak, Augmentin, and breathing treatments (albuterol inhaler, tesslon perles - not effective, and nasal spray.    Ordered Albuterol inhaler, Flonase.  Continue symptomatic treatment and monitor vitals/labs.   Transient Expressive aphasia  During ambulance ride to hospital, she experienced elevated BPs and had episode of difficulty finding words / difficulty communicating although capable of understanding. This has since resolved upon admission and is AOO x3 with no neurological deficits noted.    CTA stroke alert: no large vessel occlusion or high-grade stenosis, abnormal enhancement of R juglar bulb and upper internal jugular vein favored to be reflect mixing artifact. Consider f/u US to exclude underlying thrombus.    VAS Upper limb duplex: no evidence of acute or chronic thrombosis or thrombophlebitis in L or R upper limb.    Lipid panel, TSH, HgbA1C, and B12: pending     MRI head: pending    Neurology consulted, appreciate recommendations  Most likely 2/2 recent dehydration  Pending MRI - if negative for ischemia then d/c Plavix  Essential hypertension  Chronic, does not take any medications. Had episodes of elevated BP during ambulance ride with systolic 180s. On arrival, /88 and has been steadily trending downwards.    Continue to monitor vitals and trend BP.  Hyponatremia  POA . Patient has been experiencing significant diarrhea, N/V for 2 days and had transient episode of \"difficulty finding words.\"    Started aggressive IVF. In addition to 1.5 L NS bolus, she has been placed on 125 ml/hr NS maintenance.   Currently clear liquid diet - not concerned about aspiration at this time.  Acute multi-focal sinusitis  CT brain noted the following: Fluid levels in both maxillary " and sphenoid sinuses. Patient has been complaining of fullness in her ears and a productive cough, but denies sinus pressure or pain.    Started IV Rocephin (12/20 -)  Continue IVF.  Pending MRSA and blood cultures.  Elevated lipase  POA Lipase 362. H/o cholecystectomy and appendectomy. Endorses epigastric pain most likely 2/2 retching and extensive vomiting.    Ordered IVF.  Clear liquid diet.  Continue to monitor vitals and labs.  Occipital pain  Has a h/o migraines. Does not take any medications for headache management. Pain has been improving but she declines wanting to take any medications at this time.     See Neurology recommendations.  Continue IVF.  Pain regimen: Tylenol IV 1 g mild, Toradol 15 mg moderate, Toradol 30 mg severe.  Abnormal computed tomography angiography (CTA) of head/neck  CTA: Abnormal enhancement of the right jugular bulb and upper internal jugular vein is favored to reflect mixing artifact. Consider follow-up ultrasound to exclude underlying thrombus.     Ordered VAS upper limb duplex: no evidence of thrombosis or thrombophlebitis bilaterally.    Code Status: Level 1 - Full Code  Admission Status: INPATIENT   Disposition: Patient requires Med Surg    History of Present Illness   Patient is a 54-year-old female with a past medical history of migraines, SVT s/p ablation, V. tach, history of chronic gastritis, history of atrial tachycardia, history of mitral regurgitation, history of esophageal spasms presenting for chest pain. Starting last Saturday she has been feeling respiratory symptoms and was diagnosed with RSV this past Tuesday. She was started on Augmentin, Medrol Dosepak and breathing treatments which she was taking consistently. Then starting 2 days ago she started to have intense N/V/D after eating Chinese food. She was the only one who ate the Chinese food and there were no other sick contacts or lifestyle changes to note. At 2:30 AM on day of admission she had a sharp,  throbbing pain in the L base of her neck which radiated up into her head. She also had substernal chest pain and epigastric pain after multiple episodes of profuse vomiting. She had not tried anything for the pain. She arrived via ambulance and had elevated Bps along with an episode of expresssive aphasia in that she had difficulty finding words and explaining herself - she could comprehend what was being said to her. Denies smoking, alcohol, and recreational drugs. She lives at home with her .    In the ED she presented with tachypnea, tachycardia, and elevated WBC and was admitted for meeting sepsis criteria 2/2 either possible infection from RSV, sinusitis or dehydration from diarrhea and vomiting.    Review of Systems   Constitutional:  Positive for fatigue. Negative for chills and fever.   HENT:  Positive for ear pain (Fullness L>R). Negative for sinus pressure, sinus pain and sore throat.    Eyes:  Negative for visual disturbance.   Respiratory:  Positive for cough. Negative for wheezing.    Cardiovascular:  Positive for chest pain (substernal).   Gastrointestinal:  Positive for abdominal pain, diarrhea, nausea and vomiting. Negative for constipation.        Epigastric discomfort.   Genitourinary:  Negative for dysuria, flank pain and hematuria.   Musculoskeletal:  Negative for arthralgias and back pain.   Skin:  Negative for rash and wound.   Neurological:  Positive for weakness and headaches. Negative for dizziness and light-headedness.   All other systems reviewed and are negative.    I have reviewed the patient's PMH, PSH, Social History, Family History, Meds, and Allergies    Historical Information   Past Medical History:   Diagnosis Date    H/O esophageal spasm     Hx of atrial tachycardia     Hx of chronic gastritis     Mitral regurgitation     SVT (supraventricular tachycardia) s/p ablation([facility])     V-tach ([facility])      Past Surgical History:   Procedure Laterality Date    ABDOMINAL SURGERY       colon sx (adnoma removed)    APPENDECTOMY      CARDIAC SURGERY      ablasion 09/2015 ([facility])    CHOLECYSTECTOMY       Social History     Tobacco Use    Smoking status: Former     Types: Cigarettes     Passive exposure: Past    Smokeless tobacco: Never   Vaping Use    Vaping status: Never Used   Substance and Sexual Activity    Alcohol use: Yes    Drug use: Not Currently    Sexual activity: Not on file     E-Cigarette/Vaping    E-Cigarette Use Never User      E-Cigarette/Vaping Substances     Family History   Problem Relation Age of Onset    No Known Problems Mother     No Known Problems Father      Social History     Tobacco Use    Smoking status: Former     Types: Cigarettes     Passive exposure: Past    Smokeless tobacco: Never   Vaping Use    Vaping status: Never Used   Substance and Sexual Activity    Alcohol use: Yes    Drug use: Not Currently    Sexual activity: Not on file       Current Facility-Administered Medications:     acetaminophen (Ofirmev) injection 1,000 mg, Q6H PRN    albuterol (PROVENTIL HFA,VENTOLIN HFA) inhaler 1 puff, Q6H PRN    [START ON 12/21/2024] aspirin chewable tablet 81 mg, Daily    aspirin tablet 325 mg, Once    atorvastatin (LIPITOR) tablet 40 mg, QPM    cefTRIAXone (ROCEPHIN) IVPB (premix in dextrose) 1,000 mg 50 mL, Q24H, Last Rate: 1,000 mg (12/20/24 1412)    enoxaparin (LOVENOX) subcutaneous injection 40 mg, Daily    famotidine (PEPCID) tablet 20 mg, BID    fluticasone (FLONASE) 50 mcg/act nasal spray 1 spray, Daily    ketorolac (TORADOL) injection 15 mg, Q6H PRN    ketorolac (TORADOL) injection 30 mg, Q6H PRN    metoclopramide (REGLAN) injection 10 mg, Q8H    ondansetron (ZOFRAN) injection 4 mg, Q6H PRN    saccharomyces boulardii (FLORASTOR) capsule 250 mg, BID    simethicone (MYLICON) chewable tablet 80 mg, 4x Daily PRN    sodium chloride 0.9 % infusion, Continuous, Last Rate: 125 mL/hr (12/20/24 1302)    Prior to Admission Medications   Prescriptions Last Dose  Informant Patient Reported? Taking?   Ascorbic Acid (vitamin C) 1000 MG tablet Not Taking Self Yes No   Sig: Take 1,000 mg by mouth daily   Patient not taking: Reported on 10/21/2024   Calcium Carb-Cholecalciferol (Calcium 1000 + D) 1000-800 MG-UNIT TABS Not Taking  Yes No   Sig: Take by mouth   Patient not taking: Reported on 2024   Lidocaine-Hydrocortisone Ace 1-1 % CREA Not Taking  No No   Sig: Apply 1 application topically 2 (two) times a day   Patient not taking: Reported on 2024   Probiotic Product (Probiotic + Omega-3) CAPS Not Taking  Yes No   Sig: Take by mouth   Patient not taking: Reported on 2024   albuterol (PROVENTIL HFA,VENTOLIN HFA) 90 mcg/act inhaler 2024  Yes Yes   Sig: Inhale 1 puff every 6 (six) hours as needed   amoxicillin-clavulanate (AUGMENTIN) 875-125 mg per tablet 2024  Yes Yes   Sig: Take 1 tablet by mouth every 12 (twelve) hours   benzonatate (TESSALON PERLES) 100 mg capsule Not Taking  No No   Sig: Take 1 capsule (100 mg total) by mouth every 8 (eight) hours   Patient not taking: Reported on 2024   cholecalciferol (VITAMIN D3) 1,000 units tablet Not Taking  Yes No   Sig: Take by mouth daily   Patient not taking: Reported on 2024   co-enzyme Q-10 30 MG capsule Not Taking  Yes No   Sig: Take 30 mg by mouth 3 (three) times a day   Patient not taking: Reported on 2024   ipratropium (ATROVENT) 0.03 % nasal spray 2024  Yes Yes   Si sprays into each nostril Three times a day   magnesium 30 MG tablet Not Taking  Yes No   Sig: Take 30 mg by mouth 2 (two) times a day Calcium/Magnesium/Zinc Supplement   Patient not taking: Reported on 2024   methylPREDNISolone 4 MG tablet therapy pack 2024  Yes Yes   Sig: Take 4 mg by mouth daily   naproxen (NAPROSYN) 500 mg tablet Not Taking  No No   Sig: Take 1 tablet (500 mg total) by mouth 2 (two) times a day with meals   Patient not taking: Reported on 2024   pantoprazole (PROTONIX) 40  mg tablet Not Taking  No No   Sig: Take 1 tablet (40 mg total) by mouth daily   Patient not taking: Reported on 12/20/2024   vitamin E, tocopherol, 400 units capsule Not Taking  Yes No   Sig: Take 400 Units by mouth daily   Patient not taking: Reported on 12/20/2024   zinc gluconate 50 mg tablet Not Taking  Yes No   Sig: Take 25 mg by mouth daily   Patient not taking: Reported on 12/20/2024      Facility-Administered Medications: None     Codeine, Morphine, Percocet [oxycodone-acetaminophen], Promethazine, Sulfa antibiotics, Zithromax [azithromycin], and Latex    Objective :  Temp:  [97.1 °F (36.2 °C)-98.6 °F (37 °C)] 97.9 °F (36.6 °C)  HR:  [] 92  BP: (111-146)/(56-88) 111/56  Resp:  [15-30] 15  SpO2:  [96 %-99 %] 97 %  O2 Device: None (Room air)    Intake & Output:  I/O         12/18 0701  12/19 0700 12/19 0701  12/20 0700 12/20 0701  12/21 0700    IV Piggyback   1100    Total Intake(mL/kg)   1100 (11.8)    Net   +1100                 Weights:   IBW (Ideal Body Weight): 57 kg    Body mass index is 34.19 kg/m².  Weight (last 2 days)       Date/Time Weight    12/20/24 1148 93.2 (205.47)    12/20/24 0739 92 (202.82)          Physical Exam  Vitals and nursing note reviewed.   Constitutional:       General: She is not in acute distress.     Appearance: Normal appearance.   HENT:      Head: Normocephalic and atraumatic.      Right Ear: Tympanic membrane, ear canal and external ear normal.      Left Ear: Tympanic membrane, ear canal and external ear normal.      Nose: Nose normal.      Mouth/Throat:      Mouth: Mucous membranes are moist.      Pharynx: Oropharynx is clear.   Eyes:      Extraocular Movements: Extraocular movements intact.   Cardiovascular:      Rate and Rhythm: Normal rate and regular rhythm.      Pulses: Normal pulses.      Heart sounds: Normal heart sounds.   Pulmonary:      Effort: Pulmonary effort is normal.      Breath sounds: Normal breath sounds.   Abdominal:      General: Abdomen is flat.  Bowel sounds are normal. There is no distension.      Palpations: Abdomen is soft.      Tenderness: There is abdominal tenderness. There is no guarding.   Musculoskeletal:         General: No tenderness. Normal range of motion.      Right lower leg: No edema.      Left lower leg: No edema.   Skin:     General: Skin is warm and dry.      Capillary Refill: Capillary refill takes less than 2 seconds.   Neurological:      General: No focal deficit present.      Mental Status: She is alert and oriented to person, place, and time.   Psychiatric:         Mood and Affect: Mood normal.         Behavior: Behavior normal.         Lab Results: I have reviewed the following results:  Recent Labs     12/20/24  0750 12/20/24  0844 12/20/24  1122 12/20/24  1329   WBC 16.43*  --   --   --    HGB 15.2  --   --   --    HCT 45.8  --   --   --      --   --   --    SODIUM  --  128*  --   --    K  --  4.2  --   --    CL  --  102  --   --    CO2  --  18*  --   --    BUN  --  22  --   --    CREATININE  --  0.53*  --   --    GLUC  --  115  --   --    AST  --   --   --  31   ALT  --   --   --  61*   ALB  --   --   --  3.5   TBILI  --   --   --  0.43   ALKPHOS  --   --   --  67   PTT 21*  --   --   --    INR 0.90  --   --   --    HSTNI0 3  --   --   --    HSTNI2  --   --  4  --    LACTICACID  --   --   --  1.2     Micro:  Lab Results   Component Value Date    BLOODCX No Growth After 5 Days. 10/23/2024    BLOODCX No Growth After 5 Days. 10/23/2024       Imaging Results Review: I reviewed radiology reports from this admission including: CT chest, CT abdomen/pelvis, and CT head/neck.  Other Study Results Review: EKG was reviewed.     Currently Ordered Meds:   Current Facility-Administered Medications:     acetaminophen (Ofirmev) injection 1,000 mg, Q6H PRN    albuterol (PROVENTIL HFA,VENTOLIN HFA) inhaler 1 puff, Q6H PRN    [START ON 12/21/2024] aspirin chewable tablet 81 mg, Daily    aspirin tablet 325 mg, Once    atorvastatin (LIPITOR)  "tablet 40 mg, QPM    cefTRIAXone (ROCEPHIN) IVPB (premix in dextrose) 1,000 mg 50 mL, Q24H, Last Rate: 1,000 mg (12/20/24 1412)    enoxaparin (LOVENOX) subcutaneous injection 40 mg, Daily    famotidine (PEPCID) tablet 20 mg, BID    fluticasone (FLONASE) 50 mcg/act nasal spray 1 spray, Daily    ketorolac (TORADOL) injection 15 mg, Q6H PRN    ketorolac (TORADOL) injection 30 mg, Q6H PRN    metoclopramide (REGLAN) injection 10 mg, Q8H    ondansetron (ZOFRAN) injection 4 mg, Q6H PRN    saccharomyces boulardii (FLORASTOR) capsule 250 mg, BID    simethicone (MYLICON) chewable tablet 80 mg, 4x Daily PRN    sodium chloride 0.9 % infusion, Continuous, Last Rate: 125 mL/hr (12/20/24 1302)    VTE Pharmacologic Prophylaxis: Enoxaparin (Lovenox)  VTE Mechanical Prophylaxis: reason for no mechanical VTE prophylaxis - patient declined    Administrative Statements   I have spent a total time of >30 minutes in caring for this patient on the day of the visit/encounter including Instructions for management, Counseling / Coordination of care, Documenting in the medical record, Reviewing / ordering tests, medicine, procedures  , Obtaining or reviewing history  , and Communicating with other healthcare professionals .  Portions of the record may have been created with voice recognition software.  Occasional wrong word or \"sound a like\" substitutions may have occurred due to the inherent limitations of voice recognition software.  Read the chart carefully and recognize, using context, where substitutions have occurred.  ==  Justin Gonzáles DO  [facility]  Internal Medicine Residency PGY-1  "

## 2024-12-20 NOTE — SPEECH THERAPY NOTE
Order received, then cancelled (pt passed RN Dysphagia Assessment and anomia reportedly resolved.   Brianda David MS CCC-SLP  [place] License [id number]

## 2024-12-20 NOTE — ASSESSMENT & PLAN NOTE
It's improving. Etiology multifactorial, dehydration, recent RSV infection, cough induced.   Discussed that we can place her on migraine cocktail or fioricet. She doesn't want anything at present. Said IVF is sufficient.   She doesn't need any preventive agent at this time.

## 2024-12-20 NOTE — ED PROVIDER NOTES
Time reflects when diagnosis was documented in both MDM as applicable and the Disposition within this note       Time User Action Codes Description Comment    12/20/2024  7:19 AM Judy Castro Add [R47.89] Word finding difficulty     12/20/2024  8:52 AM Judy Castro Add [R11.2,  R19.7] Nausea vomiting and diarrhea           ED Disposition       ED Disposition   Admit    Condition   Stable    Date/Time   Fri Dec 20, 2024  9:04 AM    Comment   Case was discussed with MYNOR and the patient's admission status was agreed to be Admission Status: observation status to the service of Dr. Mejia.               Assessment & Plan       Medical Decision Making  Pt is a 55yo F who presents with vomiting and word finding difficulty.     Will plan for labs and imaging.  See ED course for results and details.    Plan to admit pt to FP. Pt discussed with admitting team and admission orders placed. Pt admitted without incident.       Amount and/or Complexity of Data Reviewed  Labs: ordered. Decision-making details documented in ED Course.  Radiology: ordered. Decision-making details documented in ED Course.  ECG/medicine tests:  Decision-making details documented in ED Course.    Risk  OTC drugs.  Prescription drug management.  Decision regarding hospitalization.        ED Course as of 12/20/24 0957   Fri Dec 20, 2024   0717 Pt with sudden onset word finding difficulty. Will call stroke alert.    0719 ECG 12 lead  Procedure Note: EKG  Date/Time: 12/20/24 7:20 AM   Interpreted by: Judy Castro MD  Indications / Diagnosis: CP, Stroke like symptoms  ECG reviewed by me, the ED Physician: yes   The EKG demonstrates:  Rhythm: sinus tachycardia  Intervals: normal intervals  Axis: RAD  QRS/Blocks: normal QRS  ST Changes: No acute ST Changes, no STD/PARRIS.   0721 POC Glucose: 102  WNL   0730 Neuro aware.    0744 CT stroke alert brain  No acute intracranial abnormality.  Multifocal sinusitis suspected.  - Pt on abx   0744 Pt  reassessed and resting comfortably. BP improved. Pt still reporting some word finding difficulty. Neuro tele-cart being set up for neuro eval.    0756 CTA stroke alert (head/neck)  1. No large vessel occlusion or high-grade stenosis  2. Abnormal enhancement of the right jugular bulb and upper internal jugular vein is favored to reflect mixing artifact. Consider follow-up ultrasound to exclude underlying thrombus.   0758 WBC(!): 16.43  Elevated. Non-specific. Awaiting further w/u.    0758 CBC and Platelet(!)  Reviewed and without actionable derangement.    0808 CT chest abdomen pelvis w contrast  No acute abnormality in the chest.  Findings suggestive of nonspecific gastroenteritis.   0901 hs TnI 0hr: 3  WNL       Medications   aspirin tablet 325 mg (has no administration in time range)   clopidogrel (PLAVIX) tablet 300 mg (has no administration in time range)   acetaminophen (Ofirmev) injection 1,000 mg (1,000 mg Intravenous New Bag 12/20/24 0758)   iohexol (OMNIPAQUE) 350 MG/ML injection (MULTI-DOSE) 100 mL (100 mL Intravenous Given 12/20/24 0729)   ketorolac (TORADOL) injection 15 mg (15 mg Intravenous Given 12/20/24 0757)   metoclopramide (REGLAN) injection 10 mg (10 mg Intravenous Given 12/20/24 0757)   sodium chloride 0.9 % bolus 1,000 mL (1,000 mL Intravenous New Bag 12/20/24 0802)   ondansetron (ZOFRAN) injection 4 mg (4 mg Intravenous Given 12/20/24 0930)       ED Risk Strat Scores                          SBIRT 20yo+      Flowsheet Row Most Recent Value   MANUELA: How many times in the past year have you...    Used an illegal drug or used a prescription medication for non-medical reasons? Never Filed at: 12/20/2024 0722                            History of Present Illness       Chief Complaint   Patient presents with    Vomiting     PT reports vomiting/nausea for past 2 days w/headache. Also reports difficulty speaking.       Past Medical History:   Diagnosis Date    Gastritis     SVT (supraventricular  tachycardia) (HCC)     V-tach (HCC)       Past Surgical History:   Procedure Laterality Date    ABDOMINAL SURGERY      colon sx (adnoma removed)    APPENDECTOMY      CARDIAC SURGERY      ablasion 09/2015 ([facility]3])    CHOLECYSTECTOMY        Family History   Problem Relation Age of Onset    No Known Problems Mother     No Known Problems Father       Social History     Tobacco Use    Smoking status: Former     Types: Cigarettes     Passive exposure: Past    Smokeless tobacco: Never   Vaping Use    Vaping status: Never Used   Substance Use Topics    Alcohol use: Yes    Drug use: Not Currently      E-Cigarette/Vaping    E-Cigarette Use Never User       E-Cigarette/Vaping Substances      I have reviewed and agree with the history as documented.     Pt is a 55yo F who presents for nausea, vomiting, and word finding difficulty.  Patient reports that she has had RSV for the past week.  Patient has been on Augmentin, Medrol Dosepak, and breathing treatments.  Patient reports that this morning she began with nausea, vomiting, and diarrhea.  Patient reports that while she was vomiting she began with sudden onset headache and EMS was subsequently called.  EMS reports that when they arrived patient was speaking without difficulty, and after their arrival patient began with word finding difficulty.  Patient reports that she does feel that her speech is abnormal.  Patient reports that her headache is posterior primarily on the left and severe.  Patient also reporting that she generally feels unwell.          Objective       ED Triage Vitals   Temperature Pulse Blood Pressure Respirations SpO2 Patient Position - Orthostatic VS   12/20/24 0717 12/20/24 0717 12/20/24 0742 12/20/24 0717 12/20/24 0717 --   (!) 97.1 °F (36.2 °C) 105 142/88 18 98 %       Temp src Heart Rate Source BP Location FiO2 (%) Pain Score    -- -- -- -- 12/20/24 0726        8      Vitals      Date and Time Temp Pulse SpO2 Resp BP Pain Score FACES Pain  Rating User   12/20/24 0930 -- 98 98 % 22 140/70 -- -- DQ   12/20/24 0845 -- 94 98 % 19 145/70 -- -- DQ   12/20/24 0830 -- -- -- -- -- 8 -- DQ   12/20/24 0824 -- 96 98 % 18 132/83 -- -- DQ   12/20/24 0757 -- -- -- -- -- 8 -- DQ   12/20/24 0742 -- 101 98 % 17 142/88 -- -- DQ   12/20/24 0726 -- -- -- -- -- 8 -- DQ   12/20/24 0717 97.1 °F (36.2 °C) 105 98 % 18 -- -- -- DQ            Physical Exam  Vitals reviewed.   Constitutional:       General: She is not in acute distress.     Appearance: She is well-developed. She is not toxic-appearing or diaphoretic.   HENT:      Head: Normocephalic and atraumatic.      Right Ear: External ear normal.      Left Ear: External ear normal.      Nose: Nose normal.      Mouth/Throat:      Pharynx: Oropharynx is clear.   Eyes:      Extraocular Movements: Extraocular movements intact.      Pupils: Pupils are equal, round, and reactive to light.   Cardiovascular:      Rate and Rhythm: Normal rate and regular rhythm.      Heart sounds: Normal heart sounds.   Pulmonary:      Effort: Pulmonary effort is normal.      Breath sounds: Normal breath sounds.   Abdominal:      General: Bowel sounds are normal. There is no distension.      Palpations: Abdomen is soft.      Tenderness: There is no abdominal tenderness.   Musculoskeletal:         General: Normal range of motion.      Cervical back: Normal range of motion and neck supple.   Skin:     General: Skin is warm and dry.      Capillary Refill: Capillary refill takes less than 2 seconds.   Neurological:      Mental Status: She is alert and oriented to person, place, and time.      Cranial Nerves: No cranial nerve deficit.      Sensory: No sensory deficit.      Motor: No weakness.      Coordination: Coordination normal.      Comments: Word finding difficulty without any other deficits  NIH 1   Psychiatric:         Speech: Speech normal.         Behavior: Behavior is cooperative.         Results Reviewed       Procedure Component Value Units  Date/Time    Basic metabolic panel [[id number]]  (Abnormal) Collected: 12/20/24 0844    Lab Status: Final result Specimen: Blood from Line, Venous Updated: 12/20/24 0907     Sodium 128 mmol/L      Potassium 4.2 mmol/L      Chloride 102 mmol/L      CO2 18 mmol/L      ANION GAP 8 mmol/L      BUN 22 mg/dL      Creatinine 0.53 mg/dL      Glucose 115 mg/dL      Calcium 7.3 mg/dL      eGFR 106 ml/min/1.73sq m     Narrative:      National Kid[employer] guidelines for Chronic Kidney Disease (CKD):     Stage 1 with normal or high GFR (GFR > 90 mL/min/1.73 square meters)    Stage 2 Mild CKD (GFR = 60-89 mL/min/1.73 square meters)    Stage 3A Moderate CKD (GFR = 45-59 mL/min/1.73 square meters)    Stage 3B Moderate CKD (GFR = 30-44 mL/min/1.73 square meters)    Stage 4 Severe CKD (GFR = 15-29 mL/min/1.73 square meters)    Stage 5 End Stage CKD (GFR <15 mL/min/1.73 square meters)  Note: GFR calculation is accurate only with a steady state creatinine    Lipase [[id number]]  (Abnormal) Collected: 12/20/24 0844    Lab Status: Final result Specimen: Blood from Line, Venous Updated: 12/20/24 0907     Lipase 362 u/L     HS Troponin I 2hr [[id number]]     Lab Status: No result Specimen: Blood     HS Troponin I 4hr [[id number]]     Lab Status: No result Specimen: Blood     HS Troponin 0hr (reflex protocol) [[id number]]  (Normal) Collected: 12/20/24 0750    Lab Status: Final result Specimen: Blood from Line, Venous Updated: 12/20/24 0900     hs TnI 0hr 3 ng/L     Clostridium difficile toxin by PCR with EIA [[id number]]     Lab Status: No result Specimen: Stool     Protime-INR [[id number]]  (Normal) Collected: 12/20/24 0750    Lab Status: Final result Specimen: Blood from Line, Venous Updated: 12/20/24 0830     Protime 12.6 seconds      INR 0.90    Narrative:      INR Therapeutic Range    Indication                                             INR Range      Atrial Fibrillation                                                2.0-3.0  Hypercoagulable State                                    2.0.2.3  Left Ventricular Asist Device                            2.0-3.0  Mechanical Heart Valve                                  -    Aortic(with afib, MI, embolism, HF, LA enlargement,    and/or coagulopathy)                                     2.0-3.0 (2.5-3.5)     Mitral                                                             2.5-3.5  Prosthetic/Bioprosthetic Heart Valve               2.0-3.0  Venous thromboembolism (VTE: VT, PE        2.0-3.0    APTT [[id number]]  (Abnormal) Collected: 12/20/24 0750    Lab Status: Final result Specimen: Blood from Line, Venous Updated: 12/20/24 0830     PTT 21 seconds     CBC and Platelet [[id number]]  (Abnormal) Collected: 12/20/24 0750    Lab Status: Final result Specimen: Blood from Line, Venous Updated: 12/20/24 0758     WBC 16.43 Thousand/uL      RBC 5.05 Million/uL      Hemoglobin 15.2 g/dL      Hematocrit 45.8 %      MCV 91 fL      MCH 30.1 pg      MCHC 33.2 g/dL      RDW 13.7 %      Platelets 331 Thousands/uL      MPV 9.4 fL     Fingerstick Glucose (POCT) [[id number]]  (Normal) Collected: 12/20/24 0719    Lab Status: Final result Specimen: Blood Updated: 12/20/24 0719     POC Glucose 102 mg/dl             CTA stroke alert (head/neck)   Final Interpretation by Eduardo Phan MD (12/20 0754)      1. No large vessel occlusion or high-grade stenosis   2. Abnormal enhancement of the right jugular bulb and upper internal jugular vein is favored to reflect mixing artifact. Consider follow-up ultrasound to exclude underlying thrombus.         Findings were directly discussed with Aman Padgett at 7:53 a.m on 12/20/2024.      Workstation performed: [id number]         CT chest abdomen pelvis w contrast   Final Interpretation by Segundo Pop MD (12/20 0805)      No acute abnormality in the chest.      Findings suggestive of nonspecific gastroenteritis.         Workstation performed: [id number]          CT stroke alert brain   Final Interpretation by Eduardo Phan MD (12/20 6461)      No acute intracranial abnormality.   Multifocal sinusitis suspected.      Findings were directly discussed with Dr. Castro at approximately 7:32 a.m. on 12/20/2024.      Workstation performed: [id number]             Procedures    ED Medication and Procedure Management   Prior to Admission Medications   Prescriptions Last Dose Informant Patient Reported? Taking?   Ascorbic Acid (vitamin C) 1000 MG tablet  Self Yes No   Sig: Take 1,000 mg by mouth daily   Patient not taking: Reported on 10/21/2024   Calcium Carb-Cholecalciferol (Calcium 1000 + D) 1000-800 MG-UNIT TABS   Yes No   Sig: Take by mouth   Patient not taking: Reported on 10/21/2024   Lidocaine-Hydrocortisone Ace 1-1 % CREA   No No   Sig: Apply 1 application topically 2 (two) times a day   Patient not taking: Reported on 4/19/2024   Probiotic Product (Probiotic + Omega-3) CAPS   Yes No   Sig: Take by mouth   Patient not taking: Reported on 10/21/2024   benzonatate (TESSALON PERLES) 100 mg capsule   No No   Sig: Take 1 capsule (100 mg total) by mouth every 8 (eight) hours   cholecalciferol (VITAMIN D3) 1,000 units tablet   Yes No   Sig: Take by mouth daily   Patient not taking: Reported on 10/21/2024   co-enzyme Q-10 30 MG capsule   Yes No   Sig: Take 30 mg by mouth 3 (three) times a day   Patient not taking: Reported on 10/21/2024   magnesium 30 MG tablet   Yes No   Sig: Take 30 mg by mouth 2 (two) times a day Calcium/Magnesium/Zinc Supplement   Patient not taking: Reported on 10/21/2024   naproxen (NAPROSYN) 500 mg tablet   No No   Sig: Take 1 tablet (500 mg total) by mouth 2 (two) times a day with meals   Patient not taking: Reported on 3/23/2021   pantoprazole (PROTONIX) 40 mg tablet   No No   Sig: Take 1 tablet (40 mg total) by mouth daily   Patient not taking: Reported on 3/23/2021   vitamin E, tocopherol, 400 units capsule   Yes No   Sig: Take 400 Units by  mouth daily   Patient not taking: Reported on 10/21/2024   zinc gluconate 50 mg tablet   Yes No   Sig: Take 25 mg by mouth daily   Patient not taking: Reported on 10/21/2024      Facility-Administered Medications: None     Patient's Medications   Discharge Prescriptions    No medications on file     No discharge procedures on file.  ED SEPSIS DOCUMENTATION   Time reflects when diagnosis was documented in both MDM as applicable and the Disposition within this note       Time User Action Codes Description Comment    12/20/2024  7:19 AM Judy Castro [R47.89] Word finding difficulty     12/20/2024  8:52 AM Judy Castro [R11.2,  R19.7] Nausea vomiting and diarrhea                  Judy Castro MD  12/20/24 0957

## 2024-12-20 NOTE — CONSULTS
[facility]   Neurology Initial Consult    Rocio Garcia is a 54 y.o. female  ICU 08/ICU 08          Information obtained from:   Chief Complaint   Patient presents with    Vomiting     PT reports vomiting/nausea for past 2 days w/headache. Also reports difficulty speaking.         Assessment/Plan:    Assessment & Plan  RSV infection  Iv hydration, antibiotics per primary team.   Hyponatremia  IV hydration   Sepsis (HCC)  Blood culture is pending.   Transient Expressive aphasia  Most likely complicated migraine from recent dehydration.  MRI brain is pending.   If negative for ischemia, then she doesn't need to remain on plavix.   Occipital pain  It's improving. Etiology multifactorial, dehydration, recent RSV infection, cough induced.   Discussed that we can place her on migraine cocktail or fioricet. She doesn't want anything at present. Said IVF is sufficient.   She doesn't need any preventive agent at this time.   Abnormal computed tomography angiography (CTA) of head/neck  Ordered carotid usg to double check for the abnormality though I believe it's an artifact.   Acute multi-focal sinusitis  Antibiotics per primary team.       Total time of encounter: 60 min.   More than 50% of time was spent in counseling and coordination of care of patient.       HPI:    Rocio Garcia is a 53 yo F that presents with severe attack of vomiting and headache this morning. Patient has been dealing with sxs of RSV for past past week. She has been on augmentin and medrol dose pack. For past day she had been having diarrhea and vomiting. This morning she felt sharp, throbbing, pulsating pain in left base of her neck radiating up. She had associated word finding difficulty that lasted upto an hour. She is feeling better during encounter. Patient does have h/o intractable chronic migraines that lasted about 2 years and remote h/o ocular migraines. She states that she's allergic to various pain and migraine meds from past. She  had been seeing neurologist at [facility].   Currently, her wbc is also elevated.       Past Medical History:   Diagnosis Date    H/O esophageal spasm     Hx of atrial tachycardia     Hx of chronic gastritis     Mitral regurgitation     SVT (supraventricular tachycardia) s/p ablation(HCC)     V-tach (HCC)        Past Surgical History:   Procedure Laterality Date    ABDOMINAL SURGERY      colon sx (adnoma removed)    APPENDECTOMY      CARDIAC SURGERY      ablasion 09/2015 ([facility])    CHOLECYSTECTOMY         Allergies   Allergen Reactions    Codeine Anaphylaxis    Morphine Anaphylaxis    Percocet [Oxycodone-Acetaminophen] Anaphylaxis    Promethazine Anaphylaxis    Sulfa Antibiotics Itching    Zithromax [Azithromycin] Itching    Latex Rash         Current Facility-Administered Medications:     acetaminophen (Ofirmev) injection 1,000 mg, 1,000 mg, Intravenous, Q6H PRN, Justin Gonzáles DO    albuterol (PROVENTIL HFA,VENTOLIN HFA) inhaler 1 puff, 1 puff, Inhalation, Q6H PRN, Justin Gonzáles DO    [START ON 12/21/2024] aspirin chewable tablet 81 mg, 81 mg, Oral, Daily, Justin Gonzáles DO    aspirin tablet 325 mg, 325 mg, Oral, Once, Justin Gonzáles DO    atorvastatin (LIPITOR) tablet 40 mg, 40 mg, Oral, QPM, Justin Gonzáles DO    cefTRIAXone (ROCEPHIN) IVPB (premix in dextrose) 1,000 mg 50 mL, 1,000 mg, Intravenous, Q24H, Justin Gonzáles DO, Last Rate: 100 mL/hr at 12/20/24 1412, 1,000 mg at 12/20/24 1412    enoxaparin (LOVENOX) subcutaneous injection 40 mg, 40 mg, Subcutaneous, Daily, Justin Gonzáles DO    famotidine (PEPCID) tablet 20 mg, 20 mg, Oral, BID, Justin Gonzáles DO, 20 mg at 12/20/24 1250    fluticasone (FLONASE) 50 mcg/act nasal spray 1 spray, 1 spray, Each Nare, Daily, Justin Gonzáles DO, 1 spray at 12/20/24 1417    ketorolac (TORADOL) injection 15 mg, 15 mg, Intravenous, Q6H PRN, Justin Gonzáles DO    ketorolac (TORADOL) injection 30 mg, 30 mg, Intravenous, Q6H PRN, Justin Gonzáles DO    ondansetron (ZOFRAN) injection 4 mg, 4 mg, Intravenous, Q6H PRN, Justin Gonzáles,  DO    saccharomyces boulardii (FLORASTOR) capsule 250 mg, 250 mg, Oral, BID, Justin Gonzáles DO    simethicone (MYLICON) chewable tablet 80 mg, 80 mg, Oral, 4x Daily PRN, Justin Gonzáles DO    sodium chloride 0.9 % infusion, 125 mL/hr, Intravenous, Continuous, Justin Gonzáles DO, Last Rate: 125 mL/hr at 12/20/24 1302, 125 mL/hr at 12/20/24 1302    Social History     Socioeconomic History    Marital status: /Civil Union     Spouse name: Not on file    Number of children: Not on file    Years of education: Not on file    Highest education level: Not on file   Occupational History    Not on file   Tobacco Use    Smoking status: Former     Types: Cigarettes     Passive exposure: Past    Smokeless tobacco: Never   Vaping Use    Vaping status: Never Used   Substance and Sexual Activity    Alcohol use: Yes    Drug use: Not Currently    Sexual activity: Not on file   Other Topics Concern    Not on file   Social History Narrative    Not on file     Social Drivers of Health     Financial Resource Strain: Not on file   Food Insecurity: No Food Insecurity (12/20/2024)    Nursing - Inadequate Food Risk Classification     Worried About Running Out of Food in the Last Year: Not on file     Ran Out of Food in the Last Year: Not on file     Ran Out of Food in the Last Year: 1   Transportation Needs: No Transportation Needs (12/20/2024)    Nursing - Transportation Risk Classification     Lack of Transportation: Not on file     Lack of Transportation: 2   Physical Activity: Not on file   Stress: Not on file   Social Connections: Not on file   Intimate Partner Violence: Unknown (12/20/2024)    Nursing IPS     Feels Physically and Emotionally Safe: Not on file     Physically Hurt by Someone: Not on file     Humiliated or Emotionally Abused by Someone: Not on file     Physically Hurt by Someone: 2     Hurt or Threatened by Someone: 2   Housing Stability: Unknown (12/20/2024)    Nursing: Inadequate Housing Risk Classification     Has Housing: Not on  file     Worried About Losing Housing: Not on file     Unable to Get Utilities: Not on file     Unable to Pay for Housing in the Last Year: 2     Has Housin       Family History   Problem Relation Age of Onset    No Known Problems Mother     No Known Problems Father          Review of systems:  Please see HPI for positive symptoms. No fever, no chills, no weight change. Ocular: No drainage, no blurred vision. HEENT:  No sore throat, earache, or congestion. No neck pain. COR:  No chest pain. No palpitations. Lungs:  +sob, wheezing,  GI:  no  nausea, no vomiting, no diarrhea, no constipation, no anorexia. :  No dysuria, frequency, or urgency. No hematuria.  Musculoskeletal:  No joint pain or swelling or edema. Skin:  No rash or itching. Psychiatric:  no anxiety, no depression. Endocrine:  No polyuria or polydipsia.    Physical examination:  Vitals:    24 1500   BP: 120/58   Pulse: 89   Resp: 16   Temp: 97.9 °F (36.6 °C)   SpO2: 96%       GENERAL APPEARANCE:  The patient is alert, oriented.  He is in no acute distress.  HEENT:  Head is normocephalic.  The sinuses are otherwise nontender.  Pupils are equal and reactive.    NECK:  Supple without lymphadenopathy.   HEART:  Regular rate and rhythm.  LUNGS:  clear to auscultation. No crackles or wheezes are heard.  ABDOMEN:  Soft, nontender, nondistended with good bowel sounds heard.    EXTREMITIES:  Without cyanosis, clubbing or edema.     Mental status:  The patient is alert, attentive, and oriented. Speech is clear and fluent, good repetition, comprehension, and naming. she recalls 3/3 objects at 5 minutes.  Cranial nerves:  CN II: Visual fields are full to confrontation. Fundoscopic exam is normal with sharp discs and no vascular changes.. Pupils are 3 mm and  reactive to light.   CN III, IV, VI: At primary gaze, there is no eye deviation.   CN V: Facial sensation is intact to pinprick in all 3 divisions bilaterally. Corneal responses are intact.  CN VII:  "Face is symmetric with normal eye closure and smile.  CN VIII: Hearing is normal to rubbing fingers  CN IX, X: Palate elevates symmetrically. Phonation is normal.  CN XI: Head turning and shoulder shrug are intact  CN XII: Tongue is midline with normal movements and no atrophy.  Motor:  There is no pronator drift of out-stretched arms.  Muscle bulk and tone are normal.     Muscle exam  Arm Right Left Leg Right Left   Deltoid 5/5 5/5 Iliopsoas 5/5 5/5   Biceps 5/5 5/5 Quads 5/5 5/5   Triceps 5/5 5/5 Hamstrings 5/5 5/5   Wrist Extension 5/5 5/5 Ankle Dorsi Flexion 5/5 5/5   Wrist Flexion 5/5 5/5 Ankle Plantar Flexion 5/5 5/5   Interossei 5/5 5/5 Ankle Eversion 5/5 5/5   APB 5/5 5/5 Ankle Inversion 5/5 5/5       Reflexes   RJ BJ TJ KJ AJ Plantars Ch's   Right 2+ 2+ 2+ 2+  Downgoing Not present   Left 2+ 2+ 2+ 2+  Downgoing Not present     Sensory:  Light touch, pinprick, position sense, and vibration sense are intact in fingers and toes.  Coordination:  Rapid alternating movements and fine finger movements are intact. There is no dysmetria on finger-to-nose and heel-knee-shin. There are no abnormal or extraneous movements. Romberg deferred.   Gait/Stance:  Deferred.     Lab Results   Component Value Date    WBC 16.43 (H) 12/20/2024    HGB 15.2 12/20/2024    HCT 45.8 12/20/2024    MCV 91 12/20/2024     12/20/2024     No results found for: \"HGBA1C\"  Lab Results   Component Value Date    ALT 61 (H) 12/20/2024    AST 31 12/20/2024    ALKPHOS 67 12/20/2024     Lab Results   Component Value Date    CALCIUM 7.3 (L) 12/20/2024    K 4.2 12/20/2024    CO2 18 (L) 12/20/2024     12/20/2024    BUN 22 12/20/2024    CREATININE 0.53 (L) 12/20/2024         Radiology          Review of reports and Independent Interpretation of images or specimens:  CT chest abdomen pelvis w contrast  Result Date: 12/20/2024  No acute abnormality in the chest. Findings suggestive of nonspecific gastroenteritis. Workstation performed: " [id number]     CTA stroke alert (head/neck)  Result Date: 12/20/2024  1. No large vessel occlusion or high-grade stenosis 2. Abnormal enhancement of the right jugular bulb and upper internal jugular vein is favored to reflect mixing artifact. Consider follow-up ultrasound to exclude underlying thrombus. Findings were directly discussed with Aman Padgett at 7:53 a.m on 12/20/2024. Workstation performed: [id number]     CT stroke alert brain  Result Date: 12/20/2024  No acute intracranial abnormality. Multifocal sinusitis suspected. Findings were directly discussed with Dr. Castro at approximately 7:32 a.m. on 12/20/2024. Workstation performed: [id number]               Thank you for this consult.      Yun Kee M.D.  [facility]  [address] [place] [id number]

## 2024-12-20 NOTE — NURSING NOTE
Attempted x3 for second set of blood cultures without results. Patient declining further attempts at this time.

## 2024-12-20 NOTE — ASSESSMENT & PLAN NOTE
Has a h/o migraines. Does not take any medications for headache management. Pain has been improving but she declines wanting to take any medications at this time.     See Neurology recommendations.  Continue IVF.  Pain regimen: Tylenol IV 1 g mild, Toradol 15 mg moderate, Toradol 30 mg severe.

## 2024-12-20 NOTE — ASSESSMENT & PLAN NOTE
Most likely complicated migraine from recent dehydration.  MRI brain is pending.   If negative for ischemia, then she doesn't need to remain on plavix.

## 2024-12-20 NOTE — ASSESSMENT & PLAN NOTE
Endorses h/o 2 days N/V/D and notes that she alone ate Chinese food prior to symptoms starting. She developed intense abdominal pain at 2:30 AM on day of admission and after several bouts of vomiting had residual substernal and epigastric CP which has since been resolving. She also was taking Augmentin and was wondering if she might have c.diff.    Pending c.diff cx and blood cx  Continue IVF  Pain regimen: Tylenol 1g mild, Toradol 15 mg moderate, Toradol 30 mg severe

## 2024-12-20 NOTE — ASSESSMENT & PLAN NOTE
During ambulance ride to hospital, she experienced elevated BPs and had episode of difficulty finding words / difficulty communicating although capable of understanding. This has since resolved upon admission and is AOO x3 with no neurological deficits noted.    CTA stroke alert: no large vessel occlusion or high-grade stenosis, abnormal enhancement of R juglar bulb and upper internal jugular vein favored to be reflect mixing artifact. Consider f/u US to exclude underlying thrombus.    VAS Upper limb duplex: no evidence of acute or chronic thrombosis or thrombophlebitis in L or R upper limb.    Lipid panel, TSH, HgbA1C, and B12: pending     MRI head: pending    Neurology consulted, appreciate recommendations  Most likely 2/2 recent dehydration  Pending MRI - if negative for ischemia then d/c Plavix

## 2024-12-20 NOTE — ASSESSMENT & PLAN NOTE
CT brain noted the following: Fluid levels in both maxillary and sphenoid sinuses. Patient has been complaining of fullness in her ears and a productive cough, but denies sinus pressure or pain.    Started IV Rocephin (12/20 -)  Continue IVF.  Pending MRSA and blood cultures.

## 2024-12-20 NOTE — ASSESSMENT & PLAN NOTE
CTA: Abnormal enhancement of the right jugular bulb and upper internal jugular vein is favored to reflect mixing artifact. Consider follow-up ultrasound to exclude underlying thrombus.     Ordered VAS upper limb duplex: no evidence of thrombosis or thrombophlebitis bilaterally.

## 2024-12-20 NOTE — TELEMEDICINE
Tele-Consultation - Neurology   Name: Rocio Garcia 54 y.o. female I MRN: [id number]  Unit/Bed#: ED CT2 I Date of Admission: 12/20/2024   Date of Service: 12/20/2024 I Hospital Day: 0   Consult to Neurology  Consult performed by: Aman Padgett MD  Consult ordered by: Judy Castro MD        Physician Requesting Evaluation: Judy Castro MD   Reason for Evaluation / Principal Problem: stroke alert    Assessment & Plan    Probable complex migraine.  Appears to have cervicogenic component and hypertensive as well contributing. Cannot exclude the acute left hemispheric stroke, either lacunar or embolic process.  CTA head and neck is not showing any clear stroke nidus or LVO.  CT head is not showing any evidence of acute infarct or developing infarct.    Admit on stroke pathway  Load aspirin 325 and Plavix 300 and then start 81 mg at 75 mg respectively from tomorrow  Lipitor 40 mg daily  Telemetry  2D echo  MRI brain without contrast  Migraine cocktail without any sedating medications  Systolic blood pressure 140-180 range  Stat head CT for any neurologic change  IV maintenance fluids  Antiimetics  Flp, tsh, hba1c, b12      Thrombolytic Decision: although within iv tnk therapeutic window, not administered given nondisabling symptoms        History of Present Illness     Patient last known well: 7 am  Stroke alert called: 7:20 am  Neurology time of arrival: 7:20 am  HPI: Rocio Garcia is a 54 y.o.  female who presents with nausea and vomiting and diarrhea sudden onset since 2:30 am.  About 45 min before the stroke alert she developed sharp left side neck pain iniitally radiating to the occiput but now higher up posteriorly. Also around the same time she then developed the word finding difficulties with subtle stutter occasional word finding substitutions.  Sbp was in the 180s and at time of stroke alert evaluation it had come down to 142/88.      She said RSV pneumonia for the past week and during this entire  period she has had intermittent bifrontal tension type HAs however this morning's headache is different and also the speech problem is new. Also the diarrhea this morning is brand new and she describes it as profuse.  She mentions also shortly after 2:30 am she also began with severe left upper quadrant abdominal pain which she still has, and chest pain earlier.  She is not on any antiplatelets or anticoagulation.     She mentions her RSV is not getting any better.  She feels that her severe cough especially all throughout the night along with her other symptoms causes pain in her neck and eventually this morning it became sharp with every cough episode.    She states she has been on medrol dose pack, augmentin, hypertropium nasal spray, and has no fevers but has had severe cough, chills, sweats.    Review of Systems  In addition to hpi also has chronic rt shoulder pain, has noise sensitivity with the headache, nauseous, vomiting, HA, diarrhea, chest pain, abdominal pain, rest negative    Objective :  Temp:  [97.1 °F (36.2 °C)] 97.1 °F (36.2 °C)  HR:  [105] 105  Resp:  [18] 18  SpO2:  [98 %] 98 %  O2 Device: None (Room air)    Physical ExamNeurological Exam  General.  Mild acute distress, nauseated and throwing up  HEENT.  Atraumatic, normocephalic    Mental status.  Alert and oriented x 3.  Attention concentration slightly diminished.  Occasional word finding and single word substitutions at times.  No receptive aphasia.  Cranial nerves II to XII intact  Motor.  No involuntary movements at rest or activation.  At least 3 power upper and lower extremities bilaterally.  Additional practitioner not present assessed individual muscles  sensory exam.  Light touch intact upper and lower extremities bilaterally  Coordination.  Finger-nose heel-to-shin is no dysmetria bilaterally in upper and lower extremities    NIHSS:  1a.Level of Consciousness: 0 = Alert   1b. LOC Questions: 0 = Answers both correctly   1c. LOC  Commands: 0 = Obeys both correctly   2. Best Gaze: 0 = Normal   3. Visual: 0 = No visual field loss   4. Facial Palsy: 0=Normal symmetric movement   5a. Motor Right Arm: 0=No drift, limb holds 90 (or 45) degrees for full 10 seconds   5b. Motor Left Arm: 0=No drift, limb holds 90 (or 45) degrees for full 10 seconds   6a. Motor Right Le=No drift, limb holds 90 (or 45) degrees for full 10 seconds   6b. Motor Left Le=No drift, limb holds 90 (or 45) degrees for full 10 seconds   7. Limb Ataxia:  0=Absent   8. Sensory: 0=Normal; no sensory loss   9. Best Language:  1=Mild to moderate aphasia; some obvious loss of fluency or facility of comprehension without significant limitation on ideas expressed or form of expression.   10. Dysarthria: 0=Normal articulation   11. Extinction and Inattention (formerly Neglect): 0=No abnormality   Total Score: 1       Modified Pendleton Score:  0 (No baseline symptoms/disability)          Imaging Results Review: I personally reviewed the following image studies in PACS and associated radiology reports: CT head. My interpretation of the radiology images/reports is: also cta head/neck. No different.          Administrative Statements   VIRTUAL CARE DOCUMENTATION:     1. This service was provided via Telemedicine using [employer]     2. Parties in the room with patient during teleconsult Family member: significant other     3. Confidentiality My office door was closed     4. Participants No one else was in the room    5. Patient acknowledged consent and understanding of privacy and security of the  Telemedicine consult. I informed the patient that I have reviewed their record in Epic and presented the opportunity for them to ask any questions regarding the visit today.  The patient agreed to participate.    6. I have spent a total time of 40 minutes critical care time in caring for this patient on the day of the visit/encounter including Counseling / Coordination of care,  not including the time spent for establishing the audio/video connection.

## 2024-12-20 NOTE — ASSESSMENT & PLAN NOTE
Chronic, does not take any medications. Had episodes of elevated BP during ambulance ride with systolic 180s. On arrival, /88 and has been steadily trending downwards.    Continue to monitor vitals and trend BP.

## 2024-12-20 NOTE — ASSESSMENT & PLAN NOTE
POA: met sepsis criteria with tachypnea, tachycardia, WBC 16.43  PT-INR WNL and PTT 21 (low), Troponins WNL, LA WNL  Labs notable for: Lipase 362, ALT 61  Could be 2/2 recent RSV infection, dehydration from diarrhea, or sinusitis.    Pending: c.diff cx, lipid panel, Hgb A1C, MRSA cx, blood cx x2, procalcitonin     ED course: given 325 mg ASA, Plavix 300 mg, 1L NS, toradol 15 mg x1, reglan 10 mg x1, zofran 4 mg x1    CT Stroke alert brain: No acute intracranial abnormality. Multifocal sinusitis suspected.  CTA Stroke: No large vessel occlusion or high-grade stenosis. Abnormal enhancement of the right jugular bulb and upper internal jugular vein is favored to reflect mixing artifact. Consider follow-up ultrasound to exclude underlying thrombus.  CT chest/abdomen/pelvis: No acute abnormality in the chest. Findings suggestive of nonspecific gastroenteritis.  VAS upper limb duplex: no evidence of thrombosis or thrombophlebitis    EKG: Sinus Tachy    Neurology consulted, appreciate recommendations  Most likely d/t recent dehydration with MRI brain pending - if MRI negative then d/c Plavix  Pt declines migraine cocktail or Fioricet for prevention.  Pt would like to continue IVF.  Ordered carotid usg.  Continuing IVF  Started on IV Rocephin to cover for possible sinusitis.  Discontinued Carotid USG based on benign CTA findings.

## 2024-12-20 NOTE — ASSESSMENT & PLAN NOTE
"POA . Patient has been experiencing significant diarrhea, N/V for 2 days and had transient episode of \"difficulty finding words.\"    Started aggressive IVF. In addition to 1.5 L NS bolus, she has been placed on 125 ml/hr NS maintenance.   Currently clear liquid diet - not concerned about aspiration at this time.  "

## 2024-12-20 NOTE — ASSESSMENT & PLAN NOTE
POA Lipase 362. H/o cholecystectomy and appendectomy. Endorses epigastric pain most likely 2/2 retching and extensive vomiting.    Ordered IVF.  Clear liquid diet.  Continue to monitor vitals and labs.

## 2024-12-21 ENCOUNTER — APPOINTMENT (OUTPATIENT)
Dept: RADIOLOGY | Facility: HOSPITAL | Age: 54
End: 2024-12-21
Payer: COMMERCIAL

## 2024-12-21 VITALS
OXYGEN SATURATION: 98 % | DIASTOLIC BLOOD PRESSURE: 69 MMHG | HEIGHT: 65 IN | RESPIRATION RATE: 18 BRPM | SYSTOLIC BLOOD PRESSURE: 125 MMHG | BODY MASS INDEX: 34.23 KG/M2 | WEIGHT: 205.47 LBS | TEMPERATURE: 97.8 F | HEART RATE: 82 BPM

## 2024-12-21 PROBLEM — R51.9 OCCIPITAL PAIN: Status: RESOLVED | Noted: 2024-12-20 | Resolved: 2024-12-21

## 2024-12-21 PROBLEM — A41.9 SEPSIS (HCC): Status: RESOLVED | Noted: 2024-12-20 | Resolved: 2024-12-21

## 2024-12-21 PROBLEM — R93.89 ABNORMAL COMPUTED TOMOGRAPHY ANGIOGRAPHY (CTA) OF NECK: Status: RESOLVED | Noted: 2024-12-20 | Resolved: 2024-12-21

## 2024-12-21 PROBLEM — E87.1 HYPONATREMIA: Status: RESOLVED | Noted: 2024-12-20 | Resolved: 2024-12-21

## 2024-12-21 PROBLEM — R47.01 EXPRESSIVE APHASIA: Status: RESOLVED | Noted: 2024-12-20 | Resolved: 2024-12-21

## 2024-12-21 LAB
ANION GAP SERPL CALCULATED.3IONS-SCNC: 5 MMOL/L (ref 4–13)
ATRIAL RATE: 110 BPM
BASOPHILS # BLD AUTO: 0.01 THOUSANDS/ÂΜL (ref 0–0.1)
BASOPHILS NFR BLD AUTO: 0 % (ref 0–1)
BUN SERPL-MCNC: 11 MG/DL (ref 5–25)
C DIFF TOX GENS STL QL NAA+PROBE: NEGATIVE
CALCIUM SERPL-MCNC: 7.3 MG/DL (ref 8.4–10.2)
CHLORIDE SERPL-SCNC: 109 MMOL/L (ref 96–108)
CHOLEST SERPL-MCNC: 133 MG/DL (ref ?–200)
CO2 SERPL-SCNC: 21 MMOL/L (ref 21–32)
CREAT SERPL-MCNC: 0.62 MG/DL (ref 0.6–1.3)
EOSINOPHIL # BLD AUTO: 0.07 THOUSAND/ÂΜL (ref 0–0.61)
EOSINOPHIL NFR BLD AUTO: 1 % (ref 0–6)
ERYTHROCYTE [DISTWIDTH] IN BLOOD BY AUTOMATED COUNT: 13.8 % (ref 11.6–15.1)
EST. AVERAGE GLUCOSE BLD GHB EST-MCNC: 105 MG/DL
GFR SERPL CREATININE-BSD FRML MDRD: 101 ML/MIN/1.73SQ M
GLUCOSE SERPL-MCNC: 86 MG/DL (ref 65–140)
HBA1C MFR BLD: 5.3 %
HCT VFR BLD AUTO: 36.9 % (ref 34.8–46.1)
HCT VFR BLD AUTO: 37.3 % (ref 34.8–46.1)
HDLC SERPL-MCNC: 50 MG/DL
HGB BLD-MCNC: 11.9 G/DL (ref 11.5–15.4)
HGB BLD-MCNC: 12.1 G/DL (ref 11.5–15.4)
IMM GRANULOCYTES # BLD AUTO: 0.05 THOUSAND/UL (ref 0–0.2)
IMM GRANULOCYTES NFR BLD AUTO: 1 % (ref 0–2)
LDLC SERPL CALC-MCNC: 68 MG/DL (ref 0–100)
LYMPHOCYTES # BLD AUTO: 1.27 THOUSANDS/ÂΜL (ref 0.6–4.47)
LYMPHOCYTES NFR BLD AUTO: 23 % (ref 14–44)
MAGNESIUM SERPL-MCNC: 1.8 MG/DL (ref 1.9–2.7)
MCH RBC QN AUTO: 30.1 PG (ref 26.8–34.3)
MCHC RBC AUTO-ENTMCNC: 32.7 G/DL (ref 31.4–37.4)
MCV RBC AUTO: 92 FL (ref 82–98)
MONOCYTES # BLD AUTO: 0.68 THOUSAND/ÂΜL (ref 0.17–1.22)
MONOCYTES NFR BLD AUTO: 12 % (ref 4–12)
MRSA NOSE QL CULT: NORMAL
NEUTROPHILS # BLD AUTO: 3.56 THOUSANDS/ÂΜL (ref 1.85–7.62)
NEUTS SEG NFR BLD AUTO: 63 % (ref 43–75)
NRBC BLD AUTO-RTO: 0 /100 WBCS
P AXIS: 67 DEGREES
PLATELET # BLD AUTO: 237 THOUSANDS/UL (ref 149–390)
PMV BLD AUTO: 9.4 FL (ref 8.9–12.7)
POTASSIUM SERPL-SCNC: 3.8 MMOL/L (ref 3.5–5.3)
PR INTERVAL: 122 MS
QRS AXIS: 111 DEGREES
QRSD INTERVAL: 82 MS
QT INTERVAL: 330 MS
QTC INTERVAL: 446 MS
RBC # BLD AUTO: 4.05 MILLION/UL (ref 3.81–5.12)
SODIUM SERPL-SCNC: 135 MMOL/L (ref 135–147)
T WAVE AXIS: 62 DEGREES
TRIGL SERPL-MCNC: 75 MG/DL (ref ?–150)
VENTRICULAR RATE: 110 BPM
VIT B12 SERPL-MCNC: 250 PG/ML (ref 180–914)
WBC # BLD AUTO: 5.64 THOUSAND/UL (ref 4.31–10.16)

## 2024-12-21 PROCEDURE — 83036 HEMOGLOBIN GLYCOSYLATED A1C: CPT

## 2024-12-21 PROCEDURE — 93010 ELECTROCARDIOGRAM REPORT: CPT | Performed by: INTERNAL MEDICINE

## 2024-12-21 PROCEDURE — 70551 MRI BRAIN STEM W/O DYE: CPT

## 2024-12-21 PROCEDURE — 85018 HEMOGLOBIN: CPT

## 2024-12-21 PROCEDURE — 85014 HEMATOCRIT: CPT

## 2024-12-21 PROCEDURE — 83735 ASSAY OF MAGNESIUM: CPT

## 2024-12-21 PROCEDURE — 85025 COMPLETE CBC W/AUTO DIFF WBC: CPT

## 2024-12-21 PROCEDURE — 97161 PT EVAL LOW COMPLEX 20 MIN: CPT

## 2024-12-21 PROCEDURE — 80061 LIPID PANEL: CPT

## 2024-12-21 PROCEDURE — 99239 HOSP IP/OBS DSCHRG MGMT >30: CPT | Performed by: INTERNAL MEDICINE

## 2024-12-21 PROCEDURE — 80048 BASIC METABOLIC PNL TOTAL CA: CPT

## 2024-12-21 RX ORDER — GUAIFENESIN 100 MG/5ML
200 SOLUTION ORAL EVERY 4 HOURS PRN
Qty: 118 ML | Refills: 0 | Status: SHIPPED | OUTPATIENT
Start: 2024-12-21

## 2024-12-21 RX ORDER — LANOLIN ALCOHOL/MO/W.PET/CERES
800 CREAM (GRAM) TOPICAL ONCE
Status: COMPLETED | OUTPATIENT
Start: 2024-12-21 | End: 2024-12-21

## 2024-12-21 RX ORDER — MAGNESIUM SULFATE HEPTAHYDRATE 40 MG/ML
2 INJECTION, SOLUTION INTRAVENOUS ONCE
Status: DISCONTINUED | OUTPATIENT
Start: 2024-12-21 | End: 2024-12-21

## 2024-12-21 RX ORDER — GUAIFENESIN 100 MG/5ML
200 SOLUTION ORAL EVERY 4 HOURS PRN
Status: DISCONTINUED | OUTPATIENT
Start: 2024-12-21 | End: 2024-12-21 | Stop reason: HOSPADM

## 2024-12-21 RX ORDER — FLUTICASONE PROPIONATE 50 MCG
1 SPRAY, SUSPENSION (ML) NASAL DAILY
Qty: 9.9 ML | Refills: 0 | Status: SHIPPED | OUTPATIENT
Start: 2024-12-22

## 2024-12-21 RX ORDER — BENZONATATE 100 MG/1
100 CAPSULE ORAL 3 TIMES DAILY PRN
Status: DISCONTINUED | OUTPATIENT
Start: 2024-12-21 | End: 2024-12-21 | Stop reason: HOSPADM

## 2024-12-21 RX ADMIN — SODIUM CHLORIDE 125 ML/HR: 0.9 INJECTION, SOLUTION INTRAVENOUS at 04:10

## 2024-12-21 RX ADMIN — ACETAMINOPHEN 975 MG: 325 TABLET ORAL at 04:42

## 2024-12-21 RX ADMIN — METOCLOPRAMIDE 10 MG: 5 INJECTION, SOLUTION INTRAMUSCULAR; INTRAVENOUS at 02:17

## 2024-12-21 RX ADMIN — FAMOTIDINE 20 MG: 20 TABLET, FILM COATED ORAL at 08:56

## 2024-12-21 RX ADMIN — ACETAMINOPHEN 975 MG: 325 TABLET ORAL at 14:53

## 2024-12-21 RX ADMIN — ASPIRIN 81 MG CHEWABLE TABLET 81 MG: 81 TABLET CHEWABLE at 08:56

## 2024-12-21 RX ADMIN — Medication 800 MG: at 08:55

## 2024-12-21 RX ADMIN — FLUTICASONE PROPIONATE 1 SPRAY: 50 SPRAY, METERED NASAL at 08:57

## 2024-12-21 RX ADMIN — METOCLOPRAMIDE 10 MG: 5 INJECTION, SOLUTION INTRAMUSCULAR; INTRAVENOUS at 08:56

## 2024-12-21 NOTE — CASE MANAGEMENT
Case Management Assessment & Discharge Planning Note    Patient name Rocio Garcia  Location ICU 08/ICU 08 MRN [id number]  : 1970 Date 2024       Current Admission Date: 2024  Current Admission Diagnosis:Sepsis (HCC)   Patient Active Problem List    Diagnosis Date Noted Date Diagnosed    Gastroenteritis 2024     RSV infection 2024     Sepsis (HCC) 2024     Hyponatremia 2024     Elevated lipase 2024     Occipital pain 2024     Abnormal computed tomography angiography (CTA) of head/neck 2024     Acute multi-focal sinusitis 2024     Transient Expressive aphasia 2024     Multiple joint pain 2012     Herniation of rectum into vagina 2012     Chronic gastritis 2011     Essential hypertension 10/14/2005     Irritable bowel syndrome 1998       LOS (days): 0  Geometric Mean LOS (GMLOS) (days):   Days to GMLOS:     OBJECTIVE:         Current admission status: Observation  Referral Reason: Stroke, Other    Preferred Pharmacy:   [id number] - [place], [place] - [address] [address]  Phone: [phone] Fax: [phone]    Primary Care Provider: Ami Aguilar MD    Primary Insurance: [employer] [facility]  Secondary Insurance:     ASSESSMENT:  Active Health Care Proxies    There are no active Health Care Proxies on file.          Obs Notice Signed: 24    Readmission Root Cause  30 Day Readmission: No    Patient Information  Admitted from:: Home  Mental Status: Alert  During Assessment patient was accompanied by: Spouse  Assessment information provided by:: Patient  Primary Caregiver: Self  Support Systems: Spouse/significant other, Family members  County of Residence: [place]  What city do you live in?: [place]  Home entry access options. Select all that apply.: Stairs  Number of steps to enter home.: 3  Type of Current Residence: 2 story home  Upon entering  residence, is there a bedroom on the main floor (no further steps)?: Yes  Upon entering residence, is there a bathroom on the main floor (no further steps)?: Yes  Living Arrangements: Lives w/ Spouse/significant other  Is patient a ?: No    Activities of Daily Living Prior to Admission  Functional Status: Independent  Completes ADLs independently?: Yes  Ambulates independently?: Yes  Does patient use assisted devices?: No  Does patient currently own DME?: No  Does patient have a history of Outpatient Therapy (PT/OT)?: Yes  Does the patient have a history of Short-Term Rehab?: No  Does patient have a history of HHC?: No  Does patient currently have HHC?: No    Patient Information Continued  Income Source: Employed  Does patient have prescription coverage?: Yes  Does patient receive dialysis treatments?: No    Means of Transportation  Means of Transport to Appts:: Drives Self      DISCHARGE DETAILS:    Discharge planning discussed with:: Patient and spouse  Freedom of Choice: Yes    Comments - Freedom of Choice: SW spoke with patient at bedside to introduce role of CM, conduct assessment and discuss discharge planning.  Patient is independent at baseline, works and drives.  Her preference is to return home when medically cleared and family will transport her home.  At this time there are no anticipated rehabilitation or CM needs for discharge and SW will continue to follow.      CM contacted family/caregiver?: Yes (Spouse at bedside)  Were Treatment Team discharge recommendations reviewed with patient/caregiver?: Yes  Did patient/caregiver verbalize understanding of patient care needs?: Yes  Were patient/caregiver advised of the risks associated with not following Treatment Team discharge recommendations?: Yes    Contacts  Patient Contacts: Sam Garcia (spouse)  Relationship to Patient:: Family  Contact Method: In Person  Reason/Outcome: Emergency Contact, Discharge Planning    Requested Home Health Care          Is the patient interested in HHC at discharge?: No    DME Referral Provided  Referral made for DME?: No    Other Referral/Resources/Interventions Provided:  Interventions: None Indicated    Would you like to participate in our [facility] service program?  : No - Declined    Treatment Team Recommendation: Home  Discharge Destination Plan:: Home  Transport at Discharge : Family

## 2024-12-21 NOTE — ASSESSMENT & PLAN NOTE
Has a h/o migraines. Does not take any medications for headache management. Pain has been improving but she declines wanting to take any medications at this time.     See Neurology recommendations.  Continue IVF.  Pain regimen: Tylenol 975mg Q8 JING, Toradol 15 mg moderate, Toradol 30 mg severe.   none

## 2024-12-21 NOTE — ASSESSMENT & PLAN NOTE
On admission, pt endorsed 2 days of N/V/D after eating chinese food. She continued to have diarrhea throughout the admission but is now tolerating foods.  notes that she alone ate Chinese food prior to symptoms starting.     C.diff was negative and IVF was continued at 125ml/hr  Pt was on Pain regimen: Tylenol 975mg Q8 JING, Toradol 15 mg moderate, Toradol 30 mg severe and zofran 4mg Q6 PRN for nausea

## 2024-12-21 NOTE — ASSESSMENT & PLAN NOTE
"POA . Patient has been experiencing significant diarrhea, N/V for 2 days and had transient episode of \"difficulty finding words.\"    Pt was Started on aggressive IVF. In addition to 1.5 L NS bolus, she was also placed on 125 ml/hr NS maintenance, repeat sodium was 135.     "

## 2024-12-21 NOTE — DISCHARGE INSTR - AVS FIRST PAGE
Continue to drink fluids with electrolytes (Gatorade, Pedialyte)  Diet as tolerated  Blood cultures are pending, If it is positive we will contact you.   Neurology outpatient follow up for migraines.

## 2024-12-21 NOTE — ASSESSMENT & PLAN NOTE
On admission, pt endorsed 2 days of N/V/D and notes that she alone ate Chinese food prior to symptoms starting. She developed intense abdominal pain at 2:30 AM on day of admission and after several bouts of vomiting had residual substernal and epigastric CP which has since been resolving. She also was taking Augmentin and was wondering if she might have c.diff.    Pending c.diff cx and blood cx  Continue IVF  Pain regimen: Tylenol 975mg Q8 JING, Toradol 15 mg moderate, Toradol 30 mg severe  Zofran 4mg Q6 PRN

## 2024-12-21 NOTE — ASSESSMENT & PLAN NOTE
CT brain noted the following: Fluid levels in both maxillary and sphenoid sinuses. Patient has been complaining of fullness in her ears and a productive cough, but denies sinus pressure or pain.  MRI with contrast showed no acute infarct.  Bilateral maxillary sinuses fluid filled with mucoid secretions, correlate.  Acute sinusitis.    Patient was started on IV Rocephin which was later discontinued due to most likely viral etiology of her sinusitis.  Started IV  Pending MRSA and blood cultures.

## 2024-12-21 NOTE — ASSESSMENT & PLAN NOTE
Was diagnosed with RSV earlier this week. Has had mild symptoms of cough, congestion through out this admission.      Pt's VS were monitored and was given Albuterol inhaler, Flonase and tessalon pearls during admission.

## 2024-12-21 NOTE — HOSPITAL COURSE
Pt is a 55 yo F who  presented with nausea, vomiting and word finding difficulty, chest pain, headache. She was recently d/g w/ RSV and was on prednisone and Augmentin.  A stroke alert was called in the ED and neurology was consulted.  Patient was given a one-time dose of aspirin 325 and Plavix 1mg in ED.  CT abdomen chest pelvis showed nonspecific gastroenteritis, CT head neck showed abnormal enhancement of the right lateral follow-up with MRI neurologic unilateral pain.  Patient was started on Rocephin for acute sinusitis but DC'd after 1 dose due to sinusitis secondary to infection.  pH is upper lip venous Doppler was negative. neurology recommended migraine cocktail and Fioricet which patient denied and just wanted to be on fluids.  MRI of the brain without contrast shows no acute infarct bilateral maxillary sinus fluid levels with mucoid secretion, correlate w/acute sinusitis.  Patient also had multiple episodes of watery diarrhea, C. difficile was negative.  Patient's diet was advanced as tolerated and is stable for discharge w/outpatient followup with Neurology for migraines.

## 2024-12-21 NOTE — ASSESSMENT & PLAN NOTE
POA: met sepsis criteria with tachypnea, tachycardia, WBC 16.43  PT-INR WNL and PTT 21 (low), Troponins WNL, LA WNL  Labs notable for: Lipase 362, ALT 61  Could be 2/2 recent RSV infection, dehydration from diarrhea, or sinusitis.  Procal elevated at 0.29,   Lipid panel WNL    Pending: c.diff cx, Hgb A1C, MRSA cx, blood cx x2    ED course: given 325 mg ASA, Plavix 300 mg, 1L NS, toradol 15 mg x1, reglan 10 mg x1, zofran 4 mg x1    CT Stroke alert brain: No acute intracranial abnormality. Multifocal sinusitis suspected.  CTA Stroke: No large vessel occlusion or high-grade stenosis. Abnormal enhancement of the right jugular bulb and upper internal jugular vein is favored to reflect mixing artifact. Consider follow-up ultrasound to exclude underlying thrombus.  CT chest/abdomen/pelvis: No acute abnormality in the chest. Findings suggestive of nonspecific gastroenteritis.  VAS upper limb duplex: no evidence of thrombosis or thrombophlebitis    EKG: Sinus Tachy    Neurology consulted, appreciate recommendations  Most likely d/t recent dehydration with MRI brain pending - if MRI negative then d/c Plavix  Pt declines migraine cocktail or Fioricet for prevention at this time.  Pt would like to continue IVF.  Ordered carotid usg.  Continuing IVF  Started on IV Rocephin to cover for possible sinusitis.  Discontinued Carotid USG based on benign CTA findings.

## 2024-12-21 NOTE — ASSESSMENT & PLAN NOTE
During ambulance ride to hospital, she experienced elevated BPs and had episode of difficulty finding words / difficulty communicating although capable of understanding. This has since resolved upon admission and is AOO x3 with no neurological deficits noted.    CTA stroke alert: no large vessel occlusion or high-grade stenosis, abnormal enhancement of R juglar bulb and upper internal jugular vein favored to be reflect mixing artifact. Consider f/u US to exclude underlying thrombus.    VAS Upper limb duplex: no evidence of acute or chronic thrombosis or thrombophlebitis in L or R upper limb.    TSH, HgbA1C, and B12: pending     MRI head: pending    Neurology consulted, appreciate recommendations  Most likely 2/2 recent dehydration  Pending MRI - if negative for ischemia then d/c Plavix

## 2024-12-21 NOTE — PLAN OF CARE
Problem: NEUROSENSORY - ADULT  Goal: Achieves stable or improved neurological status  Description: INTERVENTIONS  - Monitor and report changes in neurological status  - Monitor vital signs such as temperature, blood pressure, glucose, and any other labs ordered   - Initiate measures to prevent increased intracranial pressure  - Monitor for seizure activity and implement precautions if appropriate      Outcome: Progressing  Goal: Achieves maximal functionality and self care  Description: INTERVENTIONS  - Monitor swallowing and airway patency with patient fatigue and changes in neurological status  - Encourage and assist patient to increase activity and self care.   - Encourage visually impaired, hearing impaired and aphasic patients to use assistive/communication devices  Outcome: Progressing     Problem: GASTROINTESTINAL - ADULT  Goal: Minimal or absence of nausea and/or vomiting  Description: INTERVENTIONS:  - Administer IV fluids if ordered to ensure adequate hydration  - Maintain NPO status until nausea and vomiting are resolved  - Nasogastric tube if ordered  - Administer ordered antiemetic medications as needed  - Provide nonpharmacologic comfort measures as appropriate  - Advance diet as tolerated, if ordered  - Consider nutrition services referral to assist patient with adequate nutrition and appropriate food choices  Outcome: Progressing  Goal: Maintains or returns to baseline bowel function  Description: INTERVENTIONS:  - Assess bowel function  - Encourage oral fluids to ensure adequate hydration  - Administer IV fluids if ordered to ensure adequate hydration  - Administer ordered medications as needed  - Encourage mobilization and activity  - Consider nutritional services referral to assist patient with adequate nutrition and appropriate food choices  Outcome: Progressing  Goal: Maintains adequate nutritional intake  Description: INTERVENTIONS:  - Monitor percentage of each meal consumed  - Identify  factors contributing to decreased intake, treat as appropriate  - Assist with meals as needed  - Monitor I&O, weight, and lab values if indicated  - Obtain nutrition services referral as needed  Outcome: Progressing     Problem: METABOLIC, FLUID AND ELECTROLYTES - ADULT  Goal: Electrolytes maintained within normal limits  Description: INTERVENTIONS:  - Monitor labs and assess patient for signs and symptoms of electrolyte imbalances  - Administer electrolyte replacement as ordered  - Monitor response to electrolyte replacements, including repeat lab results as appropriate  - Instruct patient on fluid and nutrition as appropriate  Outcome: Progressing  Goal: Fluid balance maintained  Description: INTERVENTIONS:  - Monitor labs   - Monitor I/O and WT  - Instruct patient on fluid and nutrition as appropriate  - Assess for signs & symptoms of volume excess or deficit  Outcome: Progressing     Problem: CARDIOVASCULAR - ADULT  Goal: Maintains optimal cardiac output and hemodynamic stability  Description: INTERVENTIONS:  - Monitor I/O, vital signs and rhythm  - Monitor for S/S and trends of decreased cardiac output  - Administer and titrate ordered vasoactive medications to optimize hemodynamic stability  - Assess quality of pulses, skin color and temperature  - Assess for signs of decreased coronary artery perfusion  - Instruct patient to report change in severity of symptoms  Outcome: Progressing  Goal: Absence of cardiac dysrhythmias or at baseline rhythm  Description: INTERVENTIONS:  - Continuous cardiac monitoring, vital signs, obtain 12 lead EKG if ordered  - Administer antiarrhythmic and heart rate control medications as ordered  - Monitor electrolytes and administer replacement therapy as ordered  Outcome: Progressing     Problem: PAIN - ADULT  Goal: Verbalizes/displays adequate comfort level or baseline comfort level  Description: Interventions:  - Encourage patient to monitor pain and request assistance  - Assess  pain using appropriate pain scale  - Administer analgesics based on type and severity of pain and evaluate response  - Implement non-pharmacological measures as appropriate and evaluate response  - Consider cultural and social influences on pain and pain management  - Notify physician/advanced practitioner if interventions unsuccessful or patient reports new pain  Outcome: Progressing     Problem: INFECTION - ADULT  Goal: Absence or prevention of progression during hospitalization  Description: INTERVENTIONS:  - Assess and monitor for signs and symptoms of infection  - Monitor lab/diagnostic results  - Monitor all insertion sites, i.e. indwelling lines, tubes, and drains  - Monitor endotracheal if appropriate and nasal secretions for changes in amount and color  - [place] appropriate cooling/warming therapies per order  - Administer medications as ordered  - Instruct and encourage patient and family to use good hand hygiene technique  - Identify and instruct in appropriate isolation precautions for identified infection/condition  Outcome: Progressing  Goal: Absence of fever/infection during neutropenic period  Description: INTERVENTIONS:  - Monitor WBC    Outcome: Progressing     Problem: SAFETY ADULT  Goal: Patient will remain free of falls  Description: INTERVENTIONS:  - Educate patient/family on patient safety including physical limitations  - Instruct patient to call for assistance with activity   - Consult OT/PT to assist with strengthening/mobility   - Keep Call bell within reach  - Keep bed low and locked with side rails adjusted as appropriate  - Keep care items and personal belongings within reach  - Initiate and maintain comfort rounds  - Make Fall Risk Sign visible to staff  - Offer Toileting every 2 Hours, in advance of need  - Initiate/Maintain bed alarm  - Obtain necessary fall risk management equipment: slipper sock  - Apply yellow socks and bracelet for high fall risk patients  - Consider moving  patient to room near nurses station  Outcome: Progressing  Goal: Maintain or return to baseline ADL function  Description: INTERVENTIONS:  -  Assess patient's ability to carry out ADLs; assess patient's baseline for ADL function and identify physical deficits which impact ability to perform ADLs (bathing, care of mouth/teeth, toileting, grooming, dressing, etc.)  - Assess/evaluate cause of self-care deficits   - Assess range of motion  - Assess patient's mobility; develop plan if impaired  - Assess patient's need for assistive devices and provide as appropriate  - Encourage maximum independence but intervene and supervise when necessary  - Involve family in performance of ADLs  - Assess for home care needs following discharge   - Consider OT consult to assist with ADL evaluation and planning for discharge  - Provide patient education as appropriate  Outcome: Progressing  Goal: Maintains/Returns to pre admission functional level  Description: INTERVENTIONS:  - Perform AM-PAC 6 Click Basic Mobility/ Daily Activity assessment daily.  - Set and communicate daily mobility goal to care team and patient/family/caregiver.   - Collaborate with rehabilitation services on mobility goals if consulted  - Perform Range of Motion 3 times a day.  - Reposition patient every 2 hours.  - Dangle patient 3 times a day  - Stand patient 3 times a day  - Ambulate patient 3 times a day  - Out of bed to chair 3 times a day   - Out of bed for meals 3 times a day  - Out of bed for toileting  - Record patient progress and toleration of activity level   Outcome: Progressing     Problem: DISCHARGE PLANNING  Goal: Discharge to home or other facility with appropriate resources  Description: INTERVENTIONS:  - Identify barriers to discharge w/patient and caregiver  - Arrange for needed discharge resources and transportation as appropriate  - Identify discharge learning needs (meds, wound care, etc.)  - Arrange for interpretive services to assist at  discharge as needed  - Refer to Case Management Department for coordinating discharge planning if the patient needs post-hospital services based on physician/advanced practitioner order or complex needs related to functional status, cognitive ability, or social support system  Outcome: Progressing     Problem: Nutrition/Hydration-ADULT  Goal: Nutrient/Hydration intake appropriate for improving, restoring or maintaining nutritional needs  Description: Monitor and assess patient's nutrition/hydration status for malnutrition. Collaborate with interdisciplinary team and initiate plan and interventions as ordered.  Monitor patient's weight and dietary intake as ordered or per policy. Utilize nutrition screening tool and intervene as necessary. Determine patient's food preferences and provide high-protein, high-caloric foods as appropriate.     INTERVENTIONS:  - Monitor oral intake, urinary output, labs, and treatment plans  - Assess nutrition and hydration status and recommend course of action  - Evaluate amount of meals eaten  - Assist patient with eating if necessary   - Allow adequate time for meals  - Recommend/ encourage appropriate diets, oral nutritional supplements, and vitamin/mineral supplements  - Order, calculate, and assess calorie counts as needed  - Recommend, monitor, and adjust tube feedings and TPN/PPN based on assessed needs  - Assess need for intravenous fluids  - Provide specific nutrition/hydration education as appropriate  - Include patient/family/caregiver in decisions related to nutrition  Outcome: Progressing     Problem: Neurological Deficit  Goal: Neurological status is stable or improving  Description: Interventions:  - Monitor and assess patient's level of consciousness, motor function, sensory function, and level of assistance needed for ADLs.   - Monitor and report changes from baseline. Collaborate with interdisciplinary team to initiate plan and implement interventions as ordered.   -  Provide and maintain a safe environment.  - Consider seizure precautions.  - Consider fall precautions.  - Consider aspiration precautions.  - Consider bleeding precautions.  Outcome: Progressing     Problem: Activity Intolerance/Impaired Mobility  Goal: Mobility/activity is maintained at optimum level for patient  Description: Interventions:  - Assess and monitor patient  barriers to mobility and need for assistive/adaptive devices.  - Assess patient's emotional response to limitations.  - Collaborate with interdisciplinary team and initiate plans and interventions as ordered.  - Encourage independent activity per ability.  - Maintain proper body alignment.  - Perform active/passive rom as tolerated/ordered.  - Plan activities to conserve energy.  - Turn patient as appropriate  Outcome: Progressing     Problem: Communication Impairment  Goal: Ability to express needs and understand communication  Description: Assess patient's communication skills and ability to understand information.  Patient will demonstrate use of effective communication techniques, alternative methods of communication and understanding even if not able to speak.     - Encourage communication and provide alternate methods of communication as needed.  - Collaborate with case management/ for discharge needs.  - Include patient/family/caregiver in decisions related to communication.  Outcome: Progressing     Problem: Potential for Aspiration  Goal: Non-ventilated patient's risk of aspiration is minimized  Description: Assess and monitor vital signs, respiratory status, and labs (WBC).  Monitor for signs of aspiration (tachypnea, cough, rales, wheezing, cyanosis, fever).    - Assess and monitor patient's ability to swallow.  - Place patient up in chair to eat if possible.  - HOB up at 90 degrees to eat if unable to get patient up into chair.  - Supervise patient during oral intake.   - Instruct patient/ family to take small bites.  -  Instruct patient/ family to take small single sips when taking liquids.  - Follow patient-specific strategies generated by speech pathologist.  Outcome: Progressing     Problem: Nutrition  Goal: Nutrition/Hydration status is improving  Description: Monitor and assess patient's nutrition/hydration status for malnutrition (ex- brittle hair, bruises, dry skin, pale skin and conjunctiva, muscle wasting, smooth red tongue, and disorientation). Collaborate with interdisciplinary team and initiate plan and interventions as ordered.  Monitor patient's weight and dietary intake as ordered or per policy. Utilize nutrition screening tool and intervene per policy. Determine patient's food preferences and provide high-protein, high-caloric foods as appropriate.     - Assist patient with eating.  - Allow adequate time for meals.  - Encourage patient to take dietary supplement as ordered.  - Collaborate with clinical nutritionist.  - Include patient/family/caregiver in decisions related to nutrition.  Outcome: Progressing

## 2024-12-21 NOTE — ASSESSMENT & PLAN NOTE
POA: met sepsis criteria with tachypnea, tachycardia, WBC 16.43 which trended down  PT-INR WNL and PTT 21 (low), Troponins WNL, LA WNL  Labs notable for: Lipase 362, ALT 61  Could be 2/2 recent RSV infection, dehydration from diarrhea, or sinusitis.  Procal elevated at 0.29,   Lipid panel WNL    Pending: Hgb A1C, MRSA cx, blood cx x2    ED course: given 325 mg ASA, Plavix 300 mg, 1L NS, toradol 15 mg x1, reglan 10 mg x1, zofran 4 mg x1    CT Stroke alert brain: No acute intracranial abnormality. Multifocal sinusitis suspected.  CTA Stroke: No large vessel occlusion or high-grade stenosis. Abnormal enhancement of the right jugular bulb and upper internal jugular vein is favored to reflect mixing artifact. Consider follow-up ultrasound to exclude underlying thrombus.  CT chest/abdomen/pelvis: No acute abnormality in the chest. Findings suggestive of nonspecific gastroenteritis.  VAS upper limb duplex: no evidence of thrombosis or thrombophlebitis    EKG: Sinus Tachy    Neurology consulted, recommended aspirin 325, Plavix 1mg and MRI without contrast.  MRI showed no acute infarcts, positive for bilateral maxillary sinus fluid with mucus secretion.  Patient declined migraine cocktail or Fioricet at this time and only wanted to get IV fluids during the admission.   Patient was started on IV Rocephin to cover acute sinusitis, which was later discontinued  most likely due to viral etiology of the sinusitis.

## 2024-12-21 NOTE — UTILIZATION REVIEW
Initial Clinical Review    Admission: Date/Time/Statement:   Admission Orders (From admission, onward)       Ordered        12/20/24 0907  Place in Observation  Once                     Orders Placed This Encounter   Procedures    Place in Observation     Standing Status:   Standing     Number of Occurrences:   1     Level of Care:   Level 2 Stepdown / HOT [14]     ED Arrival Information       Expected   -    Arrival   12/20/2024 07:10    Acuity   Emergent              Means of arrival   Ambulance    Escorted by   [facility]    Admission type   Emergency              Arrival complaint   chest pain        Chief Complaint   Patient presents with    Vomiting     PT reports vomiting/nausea for past 2 days w/headache. Also reports difficulty speaking.     Initial Presentation:   55 y/o female with PMHx VT status post ablation, esophageal spasm, gastritis and recently diagnosed with RSV with Tx prednisone and Augmentin - present to [facility] ED on 12/20/24 2nd 5 hour history of intractable upper abdominal pain with chest pain with nausea and vomiting starting around 2 AM.  And itchiness for the last night and later woke up with severe abdominal pain associate with some vomiting and nausea and diarrhea and chest pain.  Patient also reports significant pain on the left side of her neck and the back.  En route to the hospital on ambulance patient had word finding difficulty lasting for about an hour.    In ED - Vital sign review showed slight tachycardia with tachypnea.    Exam: abdominal tenderness   CT Stroke alert brain: No acute intracranial abnormality. Multifocal sinusitis suspected.  CTA Stroke: No large vessel occlusion or high-grade stenosis. Abnormal enhancement of the right jugular bulb and upper internal jugular vein is favored to reflect mixing artifact. Consider follow-up ultrasound to exclude underlying thrombus.  CT chest/abdomen/pelvis: No acute abnormality in the chest.  Findings suggestive of nonspecific gastroenteritis.  VAS upper limb duplex: no evidence of thrombosis or thrombophlebitis  CT chest abdomen pelvis showed nonspecific gastroenteritis.    Labs: WBC 16,430  Na 128 with carbenoxolone of 28 with normal anion gap.    ED Tx: 325 mg ASA, Plavix 300 mg, 1L NS, toradol 15 mg x1, reglan 10 mg x1, zofran 4 mg x1    Placed in Observation 12/20/24 at 0907 -   Sepsis/SIRS-elevated white count likely in the setting of steroid use and also tachycardia with tachypnea.  Source could be gastroenteritis/sinusitis.  History of RSV infection.  CT chest abdomen pelvis with gastroenteritis.  CAT scan of the brain with multifocal sinusitis but sinus exam is benign.  Empiric ceftriaxone for now pending cultures.  Check stool for C. difficile toxin A and B as patient has recent antibiotic use.  Monitor fever curve and white count  Transient expressive aphasia-patient also reported significant occipital/left-sided headache.  Suspected migraine versus cough induced versus related to RSV infection.  Neurology recommending migraine cocktail or Fioricet which patient refused.  Patient received aspirin 325 mg and Plavix 1 mg in the ED.  Continue aspirin 81 mg p.o. for now.  Hyponatremia-likely in the setting of hypovolemia.  IV hydration with NS at 125 mill per hour with follow-up BMP in AM.  Mildly elevated lipase-nonspecific.  Likely in the setting of gastroenteritis.  Diet as tolerated.  History of RSV infection-hold off on steroids at the current time.  History of esophageal spasm with chest pain-troponin was unremarkable and EKG was unremarkable.  Patient currently on aspirin.  Add Pepcid twice daily    12/20/24 NEUROLOGY:  Probable complex migraine.  Appears to have cervicogenic component and hypertensive as well contributing. Cannot exclude the acute left hemispheric stroke, either lacunar or embolic process.  CTA head and neck is not showing any clear stroke nidus or LVO.  CT head is not  showing any evidence of acute infarct or developing infarct.     PLAN:  Thrombolytic Decision: although within iv tnk therapeutic window, not administered given nondisabling symptoms  Admit on stroke pathway  Load aspirin 325 and Plavix 300 and then start 81 mg at 75 mg respectively from tomorrow  Lipitor 40 mg daily  Telemetry  2D echo  MRI brain without contrast  Migraine cocktail without any sedating medications  Systolic blood pressure 140-180 range  Stat head CT for any neurologic change  IV maintenance fluids  Antiimetics  Flp, tsh, hba1c, b12      Transient Expressive aphasia  Most likely complicated migraine from recent dehydration.  MRI brain is pending.   If negative for ischemia, then she doesn't need to remain on plavix.     Occipital pain  It's improving. Etiology multifactorial, dehydration, recent RSV infection, cough induced.   Discussed that we can place her on migraine cocktail or fioricet. She doesn't want anything at present. Said IVF is sufficient.   She doesn't need any preventive agent at this time.       12/21/24 - DAY 2:  Sepsis (HCC)  POA: met sepsis criteria with tachypnea, tachycardia, WBC 16.43  PT-INR WNL and PTT 21 (low), Troponins WNL, LA WNL  Labs notable for: Lipase 362, ALT 61  Could be 2/2 recent RSV infection, dehydration from diarrhea, or sinusitis.  Procal elevated at 0.29,   Lipid panel WNL   Pending: c.diff cx, Hgb A1C, MRSA cx, blood cx x2     Neurology consulted, appreciate recommendations  Most likely d/t recent dehydration with MRI brain pending - if MRI negative then d/c Plavix  Pt declines migraine cocktail or Fioricet for prevention at this time.  Pt would like to continue IVF.  Ordered carotid usg.  Continuing IVF  Started on IV Rocephin to cover for possible sinusitis.  Discontinued Carotid USG based on benign CTA findings.     Gastroenteritis  On admission, pt endorsed 2 days of N/V/D and notes that she alone ate Chinese food prior to symptoms starting. She  developed intense abdominal pain at 2:30 AM on day of admission and after several bouts of vomiting had residual substernal and epigastric CP which has since been resolving. She also was taking Augmentin and was wondering if she might have c.diff.   Pending c.diff cx and blood cx  Continue IVF  Pain regimen: Tylenol 975mg Q8 JING, Toradol 15 mg moderate, Toradol 30 mg severe  Zofran 4mg Q6 PRN    RSV infection  as diagnosed with RSV earlier this week. Has been trying symptomatic care along with the following regimen: Medrol Dosepak, Augmentin, and breathing treatments (albuterol inhaler, tesslon perles - not effective, and nasal spray.   Ordered Albuterol inhaler, Flonase.  Continue symptomatic treatment and monitor vitals/labs.     Transient Expressive aphasia  During ambulance ride to hospital, she experienced elevated BPs and had episode of difficulty finding words / difficulty communicating although capable of understanding. This has since resolved upon admission and is AOO x3 with no neurological deficits noted.       ED Treatment-Medication Administration from 12/20/2024 0710 to 12/20/2024 1147         Date/Time Order Dose Route Action     12/20/2024 0758 acetaminophen (Ofirmev) injection 1,000 mg 1,000 mg Intravenous New Bag     12/20/2024 0729 iohexol (OMNIPAQUE) 350 MG/ML injection (MULTI-DOSE) 100 mL 100 mL Intravenous Given     12/20/2024 0757 ketorolac (TORADOL) injection 15 mg 15 mg Intravenous Given     12/20/2024 0757 metoclopramide (REGLAN) injection 10 mg 10 mg Intravenous Given     12/20/2024 0802 sodium chloride 0.9 % bolus 1,000 mL 1,000 mL Intravenous New Bag     12/20/2024 0930 ondansetron (ZOFRAN) injection 4 mg 4 mg Intravenous Given       Scheduled Medications:  acetaminophen, 975 mg, Oral, Q8H JING  aspirin, 81 mg, Oral, Daily  aspirin, 325 mg, Oral, Once  atorvastatin, 40 mg, Oral, QPM  cefTRIAXone, 1,000 mg, Intravenous, Q24H  enoxaparin, 40 mg, Subcutaneous, Daily  famotidine, 20 mg, Oral,  BID  fluticasone, 1 spray, Each Nare, Daily  metoclopramide, 10 mg, Intravenous, Q8H  saccharomyces boulardii, 250 mg, Oral, BID    Continuous IV Infusions:  sodium chloride, 125 mL/hr, Intravenous, Continuous    PRN Meds:  albuterol, 1 puff, Inhalation, Q6H PRN  ketorolac, 15 mg, Intravenous, Q6H PRN  ketorolac, 30 mg, Intravenous, Q6H PRN  ondansetron, 4 mg, Intravenous, Q6H PRN  simethicone, 80 mg, Oral, 4x Daily PRN      ED Triage Vitals   Temperature Pulse Respirations Blood Pressure SpO2 Pain Score   12/20/24 0717 12/20/24 0717 12/20/24 0717 12/20/24 0742 12/20/24 0717 12/20/24 0726   (!) 97.1 °F (36.2 °C) 105 18 142/88 98 % 8     Weight (last 2 days)       Date/Time Weight    12/20/24 1148 93.2 (205.47)    12/20/24 0739 92 (202.82)     Vital Signs (last 3 days)       Date/Time Temp Pulse Resp BP MAP (mmHg) SpO2 O2 Device Patient Position - Orthostatic VS [place] Coma Scale Score Pain    12/21/24 0800 97.6 °F (36.4 °C) 72 19 107/55 75 95 % None (Room air) Lying -- No Pain    12/21/24 0600 -- 80 19 113/59 83 91 % -- -- -- --    12/21/24 0500 -- 89 39 126/65 90 96 % -- -- -- --    12/21/24 0442 -- -- -- -- -- -- -- -- -- 8    12/21/24 0400 97.8 °F (36.6 °C) 84 22 124/58 83 97 % -- Lying -- --    12/21/24 0000 98 °F (36.7 °C) 84 20 106/55 74 92 % None (Room air) Lying 15 --    12/20/24 2215 -- 103 16 -- -- 96 % -- -- -- --    12/20/24 2200 98 °F (36.7 °C) 92 21 115/56 81 91 % None (Room air) Lying 15 --    12/20/24 2100 -- 103 19 -- -- 93 % None (Room air) -- -- --    12/20/24 2000 97.9 °F (36.6 °C) 111 20 124/61 83 96 % None (Room air) Lying 15 3    12/20/24 1915 -- 114 32 121/60 83 98 % -- -- -- --    12/20/24 1900 98.5 °F (36.9 °C) -- -- 121/60 83 -- None (Room air) Lying -- --    12/20/24 1800 -- -- -- -- -- -- -- -- 15 --    12/20/24 1706 -- 93 21 142/60 87 93 % -- -- -- --    12/20/24 1600 -- 92 15 111/56 80 97 % -- -- 15 --    12/20/24 1500 97.9 °F (36.6 °C) 89 16 120/58 83 96 % -- -- 15 --    12/20/24  1437 -- 86 18 115/56 80 99 % -- -- -- --    12/20/24 1400 -- -- -- -- -- -- -- -- 15 --    12/20/24 1300 98.5 °F (36.9 °C) 93 20 -- -- 96 % -- -- 15 --    12/20/24 1200 -- 93 30 146/88 111 99 % -- -- 15 --    12/20/24 1148 98.6 °F (37 °C) 95 20 146/88 111 97 % None (Room air) Lying -- 7    12/20/24 1045 -- 94 23 130/65 -- 97 % -- -- -- --    12/20/24 1030 -- 94 23 -- -- 97 % -- -- -- --    12/20/24 0930 -- 98 22 140/70 -- 98 % -- -- -- --    12/20/24 0845 -- 94 19 145/70 101 98 % -- -- -- --    12/20/24 0830 -- -- -- -- -- -- -- -- -- 8    12/20/24 0824 -- 96 18 132/83 -- 98 % -- -- -- --    12/20/24 0757 -- -- -- -- -- -- -- -- -- 8    12/20/24 0747 -- -- -- -- -- -- -- -- 15 --    12/20/24 0742 -- 101 17 142/88 -- 98 % -- -- -- --    12/20/24 0726 -- -- -- -- -- -- -- -- 15 8    12/20/24 0717 97.1 °F (36.2 °C) 105 18 -- -- 98 % None (Room air) -- -- --               Pertinent Labs/Diagnostic Test Results:   VAS upper limb venous duplex scan, unilateral/limited   Final Interpretation (12/20 1911)      CTA stroke alert (head/neck)   Final Interpretation (12/20 7534)      1. No large vessel occlusion or high-grade stenosis   2. Abnormal enhancement of the right jugular bulb and upper internal jugular vein is favored to reflect mixing artifact. Consider follow-up ultrasound to exclude underlying thrombus.      CT chest abdomen pelvis w contrast   Final Interpretation (12/20 0805)      No acute abnormality in the chest.      Findings suggestive of nonspecific gastroenteritis.      CT stroke alert brain   Final Interpretation (12/20 0741)      No acute intracranial abnormality.   Multifocal sinusitis suspected.         Results from last 7 days   Lab Units 12/21/24  0658 12/20/24  0750   WBC Thousand/uL 5.64 16.43*   HEMOGLOBIN g/dL 12.1 15.2   HEMATOCRIT % 37.3 45.8   PLATELETS Thousands/uL 237 331   TOTAL NEUT ABS Thousands/µL 3.56  --      Results from last 7 days   Lab Units 12/21/24  0658 12/20/24  0844   SODIUM  mmol/L 135 128*   POTASSIUM mmol/L 3.8 4.2   CHLORIDE mmol/L 109* 102   CO2 mmol/L 21 18*   ANION GAP mmol/L 5 8   BUN mg/dL 11 22   CREATININE mg/dL 0.62 0.53*   EGFR ml/min/1.73sq m 101 106   CALCIUM mg/dL 7.3* 7.3*   MAGNESIUM mg/dL 1.8*  --      Results from last 7 days   Lab Units 12/20/24  1329   AST U/L 31   ALT U/L 61*   ALK PHOS U/L 67   TOTAL PROTEIN g/dL 6.0*   ALBUMIN g/dL 3.5   TOTAL BILIRUBIN mg/dL 0.43   BILIRUBIN DIRECT mg/dL 0.07     Results from last 7 days   Lab Units 12/20/24  0719   POC GLUCOSE mg/dl 102     Results from last 7 days   Lab Units 12/21/24  0658 12/20/24  0844   GLUCOSE RANDOM mg/dL 86 115     Results from last 7 days   Lab Units 12/20/24  1332 12/20/24  1122 12/20/24  0750   HS TNI 0HR ng/L  --   --  3   HS TNI 2HR ng/L  --  4  --    HSTNI D2 ng/L  --  1  --    HS TNI 4HR ng/L 4  --   --    HSTNI D4 ng/L 1  --   --      Results from last 7 days   Lab Units 12/20/24  0750   PROTIME seconds 12.6   INR  0.90   PTT seconds 21*     Results from last 7 days   Lab Units 12/20/24  1329   TSH 3RD GENERATON uIU/mL 2.273     Results from last 7 days   Lab Units 12/20/24  1332   PROCALCITONIN ng/ml 0.29*     Results from last 7 days   Lab Units 12/20/24  1329   LACTIC ACID mmol/L 1.2     Results from last 7 days   Lab Units 12/20/24  0844   LIPASE u/L 362*     Results from last 7 days   Lab Units 12/20/24  1326   BLOOD CULTURE  Received in Microbiology Lab. Culture in Progress.     Past Medical History:   Diagnosis Date    H/O esophageal spasm     Hx of atrial tachycardia     Hx of chronic gastritis     Mitral regurgitation     SVT (supraventricular tachycardia) s/p ablation([facility])     V-tach (HCC)      Present on Admission:   Gastroenteritis   Sepsis (HCC)   Essential hypertension   Hyponatremia   Elevated lipase   Abnormal computed tomography angiography (CTA) of head/neck   Acute multi-focal sinusitis    Admitting Diagnosis: Vomiting [R11.10]  Word finding difficulty [R47.89]  Nausea  vomiting and diarrhea [R11.2, R19.7]    Age/Sex: 54 y.o. female    Network Utilization Review Department  ATTENTION: Please call with any questions or concerns to [phone] and carefully listen to the prompts so that you are directed to the right person. All voicemails are confidential.   For Discharge needs, contact Care Management DC Support Team at [phone] opt. [phone]  Send all requests for admission clinical reviews, approved or denied determinations and any other requests to dedicated fax number below belonging to the campus where the patient is receiving treatment. List of dedicated fax numbers for the Facilities:  FACILITY NAME UR FAX NUMBER   ADMISSION DENIALS (Administrative/Medical Necessity) [phone]   DISCHARGE SUPPORT TEAM (NETWORK) [phone]   PARENT CHILD HEALTH (Maternity/NICU/Pediatrics) [phone]   [facility] [phone]   [facility] [phone]   [facility] [phone]   [facility] [phone]   [facility] [phone]   [facility] [phone]   [facility] [phone]   [facility] [phone]   [facility] [phone]   Pending sale to [facility] [phone]   [facility] [phone]   [facility] [phone]

## 2024-12-21 NOTE — ASSESSMENT & PLAN NOTE
"POA . Patient has been experiencing significant diarrhea, N/V for 2 days and had transient episode of \"difficulty finding words.\"    Started aggressive IVF. In addition to 1.5 L NS bolus, she has been placed on 125 ml/hr NS maintenance.   Currently clear liquid diet consider advancing diet as tolerated.    "

## 2024-12-21 NOTE — ASSESSMENT & PLAN NOTE
Chronic, does not take any medications at home. Had episodes of elevated BP during ambulance ride with systolic 180s. On arrival, /88 and has been steadily trending downwards.    Bp was monitored throughout admission and does not need any medications on discharge.

## 2024-12-21 NOTE — ASSESSMENT & PLAN NOTE
CT brain noted the following: Fluid levels in both maxillary and sphenoid sinuses. Patient has been complaining of fullness in her ears and a productive cough, but denies sinus pressure or pain.    Started IV Rocephin (12/20 -)  Continue IVF.  Pending MRSA and blood cultures.   X Size Of Lesion In Cm (Optional): 0

## 2024-12-21 NOTE — ASSESSMENT & PLAN NOTE
Was diagnosed with RSV earlier this week. Has been trying symptomatic care along with the following regimen: Medrol Dosepak, Augmentin, and breathing treatments (albuterol inhaler, tesslon perles - not effective, and nasal spray.    Ordered Albuterol inhaler, Flonase.  Continue symptomatic treatment and monitor vitals/labs.

## 2024-12-21 NOTE — ASSESSMENT & PLAN NOTE
Has a h/o migraines. Does not take any medications for headache management. Pain has been improving but she declines wanting to take any medications at this time.     See Neurology recommendations.  Continue IVF.  Pain regimen: Tylenol 975mg Q8 JING, Toradol 15 mg moderate, Toradol 30 mg severe.

## 2024-12-21 NOTE — ASSESSMENT & PLAN NOTE
POA Lipase 362. H/o cholecystectomy and appendectomy. Endorses epigastric pain most likely 2/2 retching and extensive vomiting.    Ordered IVF.  Clear liquid diet, considered advancing diet  Continue to monitor vitals and labs.

## 2024-12-21 NOTE — DISCHARGE SUMMARY
Discharge Summary - Family Medicine   Name: Rocio Garcia 54 y.o. female I MRN: [id number]  Unit/Bed#: ICU 08 I Date of Admission: 12/20/2024   Date of Service: 12/21/2024 I Hospital Day: 0    Medical Problems       Resolved Problems  Date Reviewed: 10/21/2024          Resolved    Essential hypertension 12/21/2024     Resolved by  Roberta Lemon MD    * (Principal) Sepsis (HCC) 12/21/2024     Resolved by  Roberta Lemon MD    Hyponatremia 12/21/2024     Resolved by  Roberta Lemon MD    Occipital pain 12/21/2024     Resolved by  Roberta Lemon MD    Abnormal computed tomography angiography (CTA) of head/neck 12/21/2024     Resolved by  Roberta Lemon MD    Transient Expressive aphasia 12/21/2024     Resolved by  Roberta Lemon MD        Discharging Physician / Practitioner: Roberta Lemon MD  PCP: Ami Aguilar MD  Admission Date:   Admission Orders (From admission, onward)       Ordered        12/20/24 0907  Place in Observation  Once                          Discharge Date: 12/21/24    Consultations During Hospital Stay:  neurology    Procedures Performed:   None    Significant Findings / Test Results:   12/21: WBC 5.64, Na 135, K 3.8,  12/20: WBC 16.43, platelets 362, , CO2 18, LA 1.2,     CT stroke alert brain: No acute intracranial abnormality.   Multifocal sinusitis suspected.   MRI: No acute infarct, significant mass effect or midline shift.  Bilateral maxillary sinus fluid levels with mucoid secretion, correlate with acute sinusitis    Incidental Findings:   Acute sinusitis   I reviewed the above mentioned incidental findings with the patient and/or family and they expressed understanding.    Test Results Pending at Discharge (will require follow up):   HBA1c, blood culture      Outpatient Tests Requested:  None    Complications:  None    Reason for Admission: headache, chest pain, aphasia    HPI:  Patient is a 54-year-old female with a past medical history of migraines, SVT s/p ablation, V. tach, history of chronic  "gastritis, history of atrial tachycardia, history of mitral regurgitation, history of esophageal spasms presenting for chest pain. Starting last Saturday she has been feeling respiratory symptoms and was diagnosed with RSV this past Tuesday. She was started on Augmentin, Medrol Dosepak and breathing treatments which she was taking consistently. Then starting 2 days ago she started to have intense N/V/D after eating Chinese food. She was the only one who ate the Chinese food and there were no other sick contacts or lifestyle changes to note. At 2:30 AM on day of admission she had a sharp, throbbing pain in the L base of her neck which radiated up into her head. She also had substernal chest pain and epigastric pain after multiple episodes of profuse vomiting. She had not tried anything for the pain. She arrived via ambulance and had elevated Bps along with an episode of expresssive aphasia in that she had difficulty finding words and explaining herself - she could comprehend what was being said to her. Denies smoking, alcohol, and recreational drugs. She lives at home with her .     In the ED she presented with tachypnea, tachycardia, and elevated WBC and was admitted for meeting sepsis criteria 2/2 either possible infection from RSV, sinusitis or dehydration from diarrhea and vomiting.    \"Copied from HPI\"     Hospital Course:     Hospital Course: Pt is a 53 yo F who  presented with nausea, vomiting and word finding difficulty, chest pain, headache. She was recently d/g w/ RSV and was on prednisone and Augmentin.  A stroke alert was called in the ED and neurology was consulted.  Patient was given a one-time dose of aspirin 325 and Plavix 1mg in ED.  CT abdomen chest pelvis showed nonspecific gastroenteritis, CT head neck showed abnormal enhancement of the right lateral follow-up with MRI neurologic unilateral pain.  Patient was started on Rocephin for acute sinusitis but DC'd after 1 dose due to sinusitis " secondary to infection.  pH is upper lip venous Doppler was negative. neurology recommended migraine cocktail and Fioricet which patient denied and just wanted to be on fluids.  MRI of the brain without contrast shows no acute infarct bilateral maxillary sinus fluid levels with mucoid secretion, correlate w/acute sinusitis.  Patient also had multiple episodes of watery diarrhea, C. difficile was negative.  Patient's diet was advanced as tolerated and is stable for discharge w/outpatient followup with Neurology for migraines.     Acute multi-focal sinusitis  Assessment & Plan  CT brain noted the following: Fluid levels in both maxillary and sphenoid sinuses. Patient has been complaining of fullness in her ears and a productive cough, but denies sinus pressure or pain.  MRI with contrast showed no acute infarct.  Bilateral maxillary sinuses fluid filled with mucoid secretions, correlate.  Acute sinusitis.    Patient was started on IV Rocephin which was later discontinued due to most likely viral etiology of her sinusitis.  Started IV  Pending MRSA and blood cultures.    Elevated lipase  Assessment & Plan  POA Lipase 362. H/o cholecystectomy and appendectomy. Endorses epigastric pain most likely 2/2 retching and extensive vomiting.    Patient was started on 125 mL IV fluid and was on clear liquid diets.  Her vital signs were monitored and diet advanced to regular diet as tolerated.     RSV infection  Assessment & Plan  Was diagnosed with RSV earlier this week. Has had mild symptoms of cough, congestion through out this admission.      Pt's VS were monitored and was given Albuterol inhaler, Flonase and tessalon pearls during admission.       Gastroenteritis  Assessment & Plan  On admission, pt endorsed 2 days of N/V/D after eating chinese food. She continued to have diarrhea throughout the admission but is now tolerating foods.  notes that she alone ate Chinese food prior to symptoms starting.     C.diff was negative  "and IVF was continued at 125ml/hr  Pt was on Pain regimen: Tylenol 975mg Q8 JING, Toradol 15 mg moderate, Toradol 30 mg severe and zofran 4mg Q6 PRN for nausea    Occipital pain-resolved as of 12/21/2024  Assessment & Plan  Has a h/o migraines. Does not take any medications for headache management. Pain has been improving but she declines wanting to take any medications at this time.     See Neurology recommendations.  Continue IVF.  Pain regimen: Tylenol 975mg Q8 JING, Toradol 15 mg moderate, Toradol 30 mg severe.    Hyponatremia-resolved as of 12/21/2024  Assessment & Plan  POA . Patient has been experiencing significant diarrhea, N/V for 2 days and had transient episode of \"difficulty finding words.\"    Pt was Started on aggressive IVF. In addition to 1.5 L NS bolus, she was also placed on 125 ml/hr NS maintenance, repeat sodium was 135.       Essential hypertension-resolved as of 12/21/2024  Assessment & Plan  Chronic, does not take any medications at home. Had episodes of elevated BP during ambulance ride with systolic 180s. On arrival, /88 and has been steadily trending downwards.    Bp was monitored throughout admission and does not need any medications on discharge.     * Sepsis (HCC)-resolved as of 12/21/2024  Assessment & Plan  POA: met sepsis criteria with tachypnea, tachycardia, WBC 16.43 which trended down  PT-INR WNL and PTT 21 (low), Troponins WNL, LA WNL  Labs notable for: Lipase 362, ALT 61  Could be 2/2 recent RSV infection, dehydration from diarrhea, or sinusitis.  Procal elevated at 0.29,   Lipid panel WNL    Pending: Hgb A1C, MRSA cx, blood cx x2    ED course: given 325 mg ASA, Plavix 300 mg, 1L NS, toradol 15 mg x1, reglan 10 mg x1, zofran 4 mg x1    CT Stroke alert brain: No acute intracranial abnormality. Multifocal sinusitis suspected.  CTA Stroke: No large vessel occlusion or high-grade stenosis. Abnormal enhancement of the right jugular bulb and upper internal jugular vein is " "favored to reflect mixing artifact. Consider follow-up ultrasound to exclude underlying thrombus.  CT chest/abdomen/pelvis: No acute abnormality in the chest. Findings suggestive of nonspecific gastroenteritis.  VAS upper limb duplex: no evidence of thrombosis or thrombophlebitis    EKG: Sinus Tachy    Neurology consulted, recommended aspirin 325, Plavix 1mg and MRI without contrast.  MRI showed no acute infarcts, positive for bilateral maxillary sinus fluid with mucus secretion.  Patient declined migraine cocktail or Fioricet at this time and only wanted to get IV fluids during the admission.   Patient was started on IV Rocephin to cover acute sinusitis, which was later discontinued  most likely due to viral etiology of the sinusitis.             Condition at Discharge: good    Discharge Day Visit / Exam:   Subjective:    Pt seen and evaluated at bedside. Stated that she had a hard time sleeping last night due to all the loud noise. Continues to have 6/10 frontal headache, and denies migraine cocktail and fiorecent. Pt states that she still has diarrhea one every 1 hour. Denies any nausea, vomiting, chest pain. Is alert and oriented X4.     Vitals: Blood Pressure: 125/69 (12/21/24 1200)  Pulse: 82 (12/21/24 1200)  Temperature: 97.8 °F (36.6 °C) (12/21/24 1200)  Temp Source: Temporal (12/21/24 1200)  Respirations: 18 (12/21/24 1200)  Height: 5' 5\" (165.1 cm) (12/20/24 1148)  Weight - Scale: 93.2 kg (205 lb 7.5 oz) (12/20/24 1148)  SpO2: 98 % (12/21/24 1200)  Physical Exam  Constitutional:       General: She is not in acute distress.     Appearance: Normal appearance. She is normal weight. She is not ill-appearing.   HENT:      Right Ear: There is no impacted cerumen.      Left Ear: There is no impacted cerumen.      Nose: Nose normal. No congestion or rhinorrhea.      Mouth/Throat:      Pharynx: Oropharynx is clear. No oropharyngeal exudate or posterior oropharyngeal erythema.   Cardiovascular:      Rate and Rhythm: " Normal rate and regular rhythm.      Pulses: Normal pulses.      Heart sounds: Normal heart sounds. No murmur heard.     No friction rub.   Pulmonary:      Effort: Pulmonary effort is normal. No respiratory distress.      Breath sounds: Normal breath sounds. No stridor. No wheezing or rhonchi.   Abdominal:      General: Bowel sounds are normal. There is no distension.      Palpations: Abdomen is soft. There is no mass.      Tenderness: There is abdominal tenderness (LUQ).   Musculoskeletal:         General: No swelling or tenderness. Normal range of motion.      Cervical back: Normal range of motion. No rigidity.      Right lower leg: No edema.      Left lower leg: No edema.   Skin:     General: Skin is warm.      Capillary Refill: Capillary refill takes less than 2 seconds.      Findings: No bruising or erythema.   Neurological:      General: No focal deficit present.      Mental Status: She is alert and oriented to person, place, and time. Mental status is at baseline.   Psychiatric:         Mood and Affect: Mood normal.         Behavior: Behavior normal.         Thought Content: Thought content normal.         Judgment: Judgment normal.          Discussion with Family: Patient declined call to .     Discharge instructions/Information to patient and family:   See after visit summary for information provided to patient and family.      Provisions for Follow-Up Care:  See after visit summary for information related to follow-up care and any pertinent home health orders.      Mobility at time of Discharge:   Basic Mobility Inpatient Raw Score: 24  JH-HLM Goal: 8: Walk 250 feet or more  JH-HLM Achieved: 8: Walk 250 feet ot more  HLM Goal achieved. Continue to encourage appropriate mobility.     Disposition:   Home    Planned Readmission: NA    Discharge Medications:  See after visit summary for reconciled discharge medications provided to patient and/or family.      Administrative Statements   Discharge  Statement:  I have spent a total time of >45 minutes in caring for this patient on the day of the visit/encounter. >30 minutes of time was spent on: Diagnostic results, Prognosis, Risks and benefits of tx options, and Instructions for management.    **Please Note: This note may have been constructed using a voice recognition system**

## 2024-12-21 NOTE — ASSESSMENT & PLAN NOTE
POA Lipase 362. H/o cholecystectomy and appendectomy. Endorses epigastric pain most likely 2/2 retching and extensive vomiting.    Patient was started on 125 mL IV fluid and was on clear liquid diets.  Her vital signs were monitored and diet advanced to regular diet as tolerated.    dietitian/nutrition services

## 2024-12-21 NOTE — PHYSICAL THERAPY NOTE
PT EVALUATION     12/21/24 1245   PT Last Visit   PT Visit Date 12/21/24   Note Type   Note type Evaluation   Pain Assessment   Pain Assessment Tool 0-10   Pain Score No Pain   Restrictions/Precautions   Weight Bearing Precautions Per Order No   Other Precautions Contact/isolation   Home Living   Type of Home House   Home Layout One level;Performs ADLs on one level;Able to live on main level with bedroom/bathroom;Stairs to enter with rails  (3 PARRIS)   Bathroom Shower/Tub Tub/shower unit   Additional Comments no DME   Prior Function   Level of [place] Independent with ADLs;Independent with functional mobility;Independent with IADLS   Lives With Spouse   Receives Help From Family   IADLs Independent with driving;Independent with meal prep;Independent with medication management   Vocational Part time employment  (nurse)   Cognition   Overall Cognitive Status WFL   Arousal/Participation Cooperative   Orientation Level Oriented X4   Memory Within functional limits   Following Commands Follows all commands and directions without difficulty   Subjective   Subjective Pt reports she is feeling fine.  Getting OOB and walking to the bathroom independently.   RUE Assessment   RUE Assessment WNL   LUE Assessment   LUE Assessment WNL   RLE Assessment   RLE Assessment WNL   LLE Assessment   LLE Assessment WNL   Bed Mobility   Supine to Sit 7  Independent   Sit to Supine 7  Independent   Transfers   Sit to Stand 7  Independent   Stand to Sit 7  Independent   Ambulation/Elevation   Gait pattern WNL   Gait Assistance 7  Independent   Assistive Device None   Distance 30 feet with changes in direction   Stair Management Assistance Not tested   Balance   Static Sitting Good   Dynamic Sitting Good   Static Standing Good   Dynamic Standing Good   Ambulatory Good   Activity Tolerance   Activity Tolerance Patient tolerated treatment well   Nurse Made Aware yes: Theodore MENENDEZ   Assessment   Prognosis Good   Assessment Patient seen for  Physical Therapy evaluation. Patient admitted with Sepsis (HCC).  Comorbidities affecting patient's physical performance include:  Migraines, SVT s/p ablation, Vtach, chronic gastritis. Personal factors affecting patient at time of initial evaluation include: lives in single story house and stairs to enter home. Prior to admission, patient was independent with functional mobility without assistive device, independent with ADLS, independent with IADLS, living with  in a single level home with 3 steps to enter, ambulating household distance, ambulating community distances, and works part time.  Please find objective findings from Physical Therapy assessment regarding body systems outlined above with  no impairments or limitations The Barthel Index was used as a functional outcome tool presenting with a score of Barthel Index Score: 100 today indicating no limitations of functional mobility and ADLS.  Patient's clinical presentation is currently stable  as seen in patient's presentation . Pt would benefit from continued Physical Therapy treatment to address deficits as defined above and maximize level of functional mobility. As demonstrated by objective findings, the assigned level of complexity for this evaluation is low.The patient's AM-PAC Basic Mobility Inpatient Short Form Raw Score is 24. A Raw score of greater than 16 suggests the patient may benefit from discharge to home. Pt has no skilled PT needs for this admission. Please also refer to the recommendation of the Physical Therapist for safe discharge planning.   Goals   Patient Goals to go home today   AM-PAC Basic Mobility Inpatient   Turning in Flat Bed Without Bedrails 4   Lying on Back to Sitting on Edge of Flat Bed Without Bedrails 4   Moving Bed to Chair 4   Standing Up From Chair Using Arms 4   Walk in Room 4   Climb 3-5 Stairs With Railing 4   Basic Mobility Inpatient Raw Score 24   Basic Mobility Standardized Score 57.68   [facility]  Level Of Mobility   -[facility] Goal 8: Walk 250 feet or more   JH-HLM Achieved 7: Walk 25 feet or more   Modified Harrison Scale   Modified Nnamdi Scale 0   Barthel Index   Feeding 10   Bathing 5   Grooming Score 5   Dressing Score 10   Bladder Score 10   Bowels Score 10   Toilet Use Score 10   Transfers (Bed/Chair) Score 15   Mobility (Level Surface) Score 15   Stairs Score 10   Barthel Index Score 100   End of Consult   Patient Position at End of Consult Supine;Bed/Chair alarm activated;All needs within reach  (pt in long sit in bed ;  at bedside.)   Licensure   [place] License Number  Dorita Bazzi PT  [id number]   Pt is independent with all functional mobility and does not require skilled PT for this admission.  No rehab needs upon discharge.

## 2024-12-25 LAB — BACTERIA BLD CULT: NORMAL

## 2025-01-19 PROBLEM — J01.90 ACUTE SINUSITIS: Status: RESOLVED | Noted: 2024-12-20 | Resolved: 2025-01-19

## 2025-01-19 PROBLEM — K52.9 GASTROENTERITIS: Status: RESOLVED | Noted: 2024-12-20 | Resolved: 2025-01-19

## 2025-05-15 ENCOUNTER — OFFICE VISIT (OUTPATIENT)
Dept: URGENT CARE | Facility: CLINIC | Age: 55
End: 2025-05-15
Payer: COMMERCIAL

## 2025-05-15 ENCOUNTER — TELEPHONE (OUTPATIENT)
Dept: URGENT CARE | Facility: CLINIC | Age: 55
End: 2025-05-15

## 2025-05-15 VITALS
BODY MASS INDEX: 34.28 KG/M2 | HEART RATE: 72 BPM | TEMPERATURE: 97.6 F | WEIGHT: 206 LBS | RESPIRATION RATE: 16 BRPM | SYSTOLIC BLOOD PRESSURE: 124 MMHG | DIASTOLIC BLOOD PRESSURE: 78 MMHG | OXYGEN SATURATION: 99 %

## 2025-05-15 DIAGNOSIS — R31.9 URINARY TRACT INFECTION WITH HEMATURIA, SITE UNSPECIFIED: Primary | ICD-10-CM

## 2025-05-15 DIAGNOSIS — N39.0 URINARY TRACT INFECTION WITH HEMATURIA, SITE UNSPECIFIED: Primary | ICD-10-CM

## 2025-05-15 DIAGNOSIS — R39.9 UTI SYMPTOMS: ICD-10-CM

## 2025-05-15 LAB
SL AMB  POCT GLUCOSE, UA: ABNORMAL
SL AMB LEUKOCYTE ESTERASE,UA: ABNORMAL
SL AMB POCT BILIRUBIN,UA: ABNORMAL
SL AMB POCT BLOOD,UA: ABNORMAL
SL AMB POCT CLARITY,UA: ABNORMAL
SL AMB POCT COLOR,UA: YELLOW
SL AMB POCT KETONES,UA: 15
SL AMB POCT NITRITE,UA: POSITIVE
SL AMB POCT PH,UA: 6
SL AMB POCT SPECIFIC GRAVITY,UA: 1
SL AMB POCT URINE PROTEIN: ABNORMAL
SL AMB POCT UROBILINOGEN: 1

## 2025-05-15 PROCEDURE — 99213 OFFICE O/P EST LOW 20 MIN: CPT | Performed by: PHYSICIAN ASSISTANT

## 2025-05-15 PROCEDURE — 81002 URINALYSIS NONAUTO W/O SCOPE: CPT | Performed by: PHYSICIAN ASSISTANT

## 2025-05-15 RX ORDER — NITROFURANTOIN 25; 75 MG/1; MG/1
100 CAPSULE ORAL 2 TIMES DAILY
Qty: 10 CAPSULE | Refills: 0 | Status: SHIPPED | OUTPATIENT
Start: 2025-05-15 | End: 2025-05-20

## 2025-05-15 NOTE — PROGRESS NOTES
[facility]        NAME: Rocio Garcia is a 54 y.o. female  : 1970    MRN: [id number]  DATE: May 15, 2025  TIME: 5:58 PM    Assessment and Plan   Urinary tract infection with hematuria, site unspecified [N39.0, R31.9]  1. Urinary tract infection with hematuria, site unspecified  nitrofurantoin (MACROBID) 100 mg capsule      2. UTI symptoms  POCT urine dip    Urine culture    Urine culture            Patient Instructions     Patient Instructions   Your urine dip came back positive. I will send for culture and follow up if the antibiotic needs to be changed.   I recommend Pyridium over the counter for discomfort. You can take it for 2 days.  I recommend you drink plenty of fluids. Monitor for any worsening symptoms. If you develop worse abdominal pain, back pain, fever/chills, inability to drink liquids or have a decrease in the amount of urine you make go to the Emergency room.           Follow up with PCP in 3-5 days.  Proceed to  ER if symptoms worsen.    Chief Complaint     Chief Complaint   Patient presents with    Possible UTI     Pt presents with painful urination, started today         History of Present Illness       The patient is a 54-year-old female presenting today for possible UTI.  Reports dysuria that began today.  Denies hematuria.  States over the last week she thought she was passing a kidney stone but did not develop any new symptoms until today.  History of kidney stones and pyelonephritis.  Does admit to allergies to azithromycin and sulfa antibiotics.  Denies fevers or chills.  Denies chest pain or shortness of breath.  Denies back pain.  Is able to eat and drink.      Review of Systems   Review of Systems   Constitutional:  Negative for activity change, appetite change, chills, fatigue and fever.   Eyes:  Negative for pain and visual disturbance.   Respiratory:  Negative for cough, chest tightness and shortness of breath.    Cardiovascular:  Negative for chest pain and  palpitations.   Gastrointestinal:  Negative for abdominal pain, diarrhea, nausea and vomiting.   Genitourinary:  Positive for dysuria. Negative for decreased urine volume, difficulty urinating, frequency, hematuria, vaginal discharge and vaginal pain.   Musculoskeletal:  Negative for arthralgias, back pain and myalgias.   Skin:  Negative for color change, pallor and rash.   Neurological:  Negative for seizures, syncope and headaches.   All other systems reviewed and are negative.        Current Medications     Current Medications[1]    Current Allergies     Allergies as of 05/15/2025 - Reviewed 05/15/2025   Allergen Reaction Noted    Codeine Anaphylaxis 09/28/2020    Morphine Anaphylaxis 07/05/2019    Percocet [oxycodone-acetaminophen] Anaphylaxis 07/05/2019    Promethazine Anaphylaxis 07/08/2021    Sulfa antibiotics Itching 07/05/2019    Zithromax [azithromycin] Itching 07/05/2019    Latex Rash 12/04/2021            The following portions of the patient's history were reviewed and updated as appropriate: allergies, current medications, past family history, past medical history, past social history, past surgical history and problem list.     Past Medical History:   Diagnosis Date    Abnormal computed tomography angiography (CTA) of head/neck 12/20/2024    H/O esophageal spasm     Hx of atrial tachycardia     Hx of chronic gastritis     Mitral regurgitation     Occipital pain 12/20/2024    SVT (supraventricular tachycardia) s/p ablation(HCC)     Transient Expressive aphasia 12/20/2024    V-tach (HCC)        Past Surgical History:   Procedure Laterality Date    ABDOMINAL SURGERY      colon sx (adnoma removed)    APPENDECTOMY      CARDIAC SURGERY      ablasion 09/2015 ([facility])    CHOLECYSTECTOMY         Family History   Problem Relation Age of Onset    No Known Problems Mother     No Known Problems Father          Medications have been verified.        Objective   /78   Pulse 72   Temp 97.6 °F  (36.4 °C)   Resp 16   Wt 93.4 kg (206 lb)   SpO2 99%   BMI 34.28 kg/m²        Physical Exam     Physical Exam  Constitutional:       General: She is not in acute distress.     Appearance: Normal appearance. She is normal weight. She is not ill-appearing, toxic-appearing or diaphoretic.   HENT:      Head: Normocephalic and atraumatic.      Mouth/Throat:      Mouth: Mucous membranes are moist.      Pharynx: Oropharynx is clear. No oropharyngeal exudate or posterior oropharyngeal erythema.     Cardiovascular:      Rate and Rhythm: Normal rate and regular rhythm.      Heart sounds: Normal heart sounds. No murmur heard.     No friction rub. No gallop.   Pulmonary:      Effort: Pulmonary effort is normal. No respiratory distress.      Breath sounds: Normal breath sounds. No stridor. No wheezing, rhonchi or rales.   Chest:      Chest wall: No tenderness.   Abdominal:      General: Abdomen is flat. There is no distension.      Palpations: Abdomen is soft. There is no mass.      Tenderness: There is no abdominal tenderness. There is no right CVA tenderness, left CVA tenderness, guarding or rebound.      Hernia: No hernia is present.     Skin:     General: Skin is warm and dry.      Capillary Refill: Capillary refill takes less than 2 seconds.     Neurological:      Mental Status: She is alert.                      [1]   Current Outpatient Medications:     nitrofurantoin (MACROBID) 100 mg capsule, Take 1 capsule (100 mg total) by mouth 2 (two) times a day for 5 days, Disp: 10 capsule, Rfl: 0    albuterol (PROVENTIL HFA,VENTOLIN HFA) 90 mcg/act inhaler, Inhale 1 puff every 6 (six) hours as needed (Patient not taking: Reported on 5/15/2025), Disp: , Rfl:     benzonatate (TESSALON PERLES) 100 mg capsule, Take 1 capsule (100 mg total) by mouth every 8 (eight) hours (Patient not taking: Reported on 5/15/2025), Disp: 21 capsule, Rfl: 0    fluticasone (FLONASE) 50 mcg/act nasal spray, 1 spray into each nostril daily (Patient not  taking: Reported on 5/15/2025), Disp: 9.9 mL, Rfl: 0    guaiFENesin (ROBITUSSIN) 200 MG/10ML oral liquid, Take 10 mL (200 mg total) by mouth every 4 (four) hours as needed (cough, congestion) (Patient not taking: Reported on 5/15/2025), Disp: 118 mL, Rfl: 0    ipratropium (ATROVENT) 0.03 % nasal spray, 2 sprays into each nostril Three times a day, Disp: , Rfl:

## 2025-05-15 NOTE — TELEPHONE ENCOUNTER
Patient calling reporting that dysuria is increasing.  She also has hematuria now.  Recommending doing Azo for the next 1 to 2 days while the antibiotic starts to take effect.  ER if worsening.

## 2025-05-19 ENCOUNTER — RESULTS FOLLOW-UP (OUTPATIENT)
Dept: URGENT CARE | Facility: CLINIC | Age: 55
End: 2025-05-19

## 2025-05-19 LAB
BACTERIA UR CULT: ABNORMAL
Lab: ABNORMAL
SL AMB ANTIMICROBIAL SUSCEPTIBILITY: ABNORMAL

## 2025-07-09 ENCOUNTER — OFFICE VISIT (OUTPATIENT)
Dept: URGENT CARE | Facility: CLINIC | Age: 55
End: 2025-07-09
Payer: COMMERCIAL

## 2025-07-09 VITALS
WEIGHT: 204 LBS | TEMPERATURE: 97.8 F | BODY MASS INDEX: 33.95 KG/M2 | SYSTOLIC BLOOD PRESSURE: 118 MMHG | OXYGEN SATURATION: 100 % | RESPIRATION RATE: 16 BRPM | HEART RATE: 98 BPM | DIASTOLIC BLOOD PRESSURE: 80 MMHG

## 2025-07-09 DIAGNOSIS — R30.0 DYSURIA: Primary | ICD-10-CM

## 2025-07-09 LAB
SL AMB  POCT GLUCOSE, UA: NORMAL
SL AMB LEUKOCYTE ESTERASE,UA: NORMAL
SL AMB POCT BILIRUBIN,UA: NORMAL
SL AMB POCT BLOOD,UA: NORMAL
SL AMB POCT CLARITY,UA: NORMAL
SL AMB POCT COLOR,UA: NORMAL
SL AMB POCT KETONES,UA: NORMAL
SL AMB POCT NITRITE,UA: NORMAL
SL AMB POCT PH,UA: 6
SL AMB POCT SPECIFIC GRAVITY,UA: 1.03
SL AMB POCT URINE PROTEIN: NORMAL
SL AMB POCT UROBILINOGEN: 0.2

## 2025-07-09 PROCEDURE — 81002 URINALYSIS NONAUTO W/O SCOPE: CPT | Performed by: PHYSICIAN ASSISTANT

## 2025-07-09 PROCEDURE — 99213 OFFICE O/P EST LOW 20 MIN: CPT | Performed by: PHYSICIAN ASSISTANT

## 2025-07-09 RX ORDER — PHENAZOPYRIDINE HYDROCHLORIDE 200 MG/1
200 TABLET, FILM COATED ORAL
Qty: 6 TABLET | Refills: 0 | Status: SHIPPED | OUTPATIENT
Start: 2025-07-09 | End: 2025-07-11

## 2025-07-09 NOTE — PROGRESS NOTES
[facility]  Name: Rocio Garcia      : 1970      MRN: [id number]  Encounter Provider: Mercedes Garcia PA-C  Encounter Date: 2025   Encounter department: [facility] [place]  :  Assessment & Plan  Dysuria    Orders:    POCT urine dip    phenazopyridine (PYRIDIUM) 200 mg tablet; Take 1 tablet (200 mg total) by mouth 3 (three) times a day with meals for 2 days  Advised to hydrate and avoid irritants such as alcohol and caffeine during this time to decrease discomfort with urination. Start Pyridium for discomfort. Go to ER if flank pain worsens, fever develops, or gross hematuria is present. Urine dip here is only positive for trace protein, so I suspect the discomfort was from the trauma of the stone passing through urethra. Encouraged her to drink plenty of fluids.      Patient Instructions  Follow up with PCP in 3-5 days.  Proceed to  ER if symptoms worsen.    If tests are performed, our office will contact you with results only if changes need to made to the care plan discussed with you at the visit. You can review your full results on [facility] MyChart.    Chief Complaint:   Chief Complaint   Patient presents with    Possible UTI     Pt presents with LRQ back pain, lower ABD pain/ pressure; started yesterday     History of Present Illness   Rocio is a 53 y/o female with a HX of kidney stones and pyelonephritis who presents today for urinary frequency, flank pain, and suprapubic discomfort x 2 days. States yesterday she felt a sharp pain in her right kidney followed by pain radiating around to her bladder. Today she had difficulty urinating and felt the urge to urinate and believes she passed a stone due to urethral discomfort after urinating. Had similar, but less severe, discomfort while urinating for the next few times but when she gave the urine sample in the office did not have any discomfort. States it felt like prior kidney stones she had passed. Denies nausea, fever, or  hematuria. Reports she has had 3 prior UTIs this year. Has not used OTC meds for discomfort.           Review of Systems   Constitutional:  Negative for chills, fatigue and fever.   HENT: Negative.     Eyes: Negative.    Respiratory:  Negative for cough and shortness of breath.    Cardiovascular:  Negative for chest pain.   Gastrointestinal:  Positive for abdominal pain. Negative for diarrhea, nausea and vomiting.   Endocrine: Negative.    Genitourinary:  Positive for difficulty urinating, flank pain and frequency. Negative for decreased urine volume, dysuria, hematuria and urgency.   Neurological: Negative.  Negative for dizziness and headaches.   Hematological: Negative.    Psychiatric/Behavioral: Negative.       Past Medical History   Past Medical History[1]  Past Surgical History[2]  Family History[3]  she reports that she has quit smoking. Her smoking use included cigarettes. She has been exposed to tobacco smoke. She has never used smokeless tobacco. She reports current alcohol use. She reports that she does not currently use drugs.  Current Outpatient Medications   Medication Instructions    albuterol (PROVENTIL HFA,VENTOLIN HFA) 90 mcg/act inhaler 1 puff, Every 6 hours PRN    benzonatate (TESSALON PERLES) 100 mg, Oral, Every 8 hours    Cholecalciferol 125 MCG (5000 UT) capsule Oral    fluticasone (FLONASE) 50 mcg/act nasal spray 1 spray, Nasal, Daily    guaiFENesin (ROBITUSSIN) 200 mg, Oral, Every 4 hours PRN    ipratropium (ATROVENT) 0.03 % nasal spray 2 sprays, 3 times daily    phenazopyridine (PYRIDIUM) 200 mg, Oral, 3 times daily with meals   Allergies[4]     Objective   /80   Pulse 98   Temp 97.8 °F (36.6 °C)   Resp 16   Wt 92.5 kg (204 lb)   SpO2 100%   BMI 33.95 kg/m²      Physical Exam  Vitals and nursing note reviewed.   Constitutional:       General: She is not in acute distress.     Appearance: Normal appearance. She is normal weight. She is not ill-appearing.   HENT:      Head:  "Normocephalic and atraumatic.      Nose: Nose normal.      Mouth/Throat:      Mouth: Mucous membranes are moist.      Pharynx: Oropharynx is clear.     Eyes:      Extraocular Movements: Extraocular movements intact.      Conjunctiva/sclera: Conjunctivae normal.       Cardiovascular:      Rate and Rhythm: Normal rate and regular rhythm.      Pulses: Normal pulses.      Heart sounds: Normal heart sounds. No murmur heard.  Pulmonary:      Effort: Pulmonary effort is normal. No respiratory distress.      Breath sounds: Normal breath sounds. No wheezing.   Abdominal:      General: Abdomen is flat.      Palpations: Abdomen is soft.      Tenderness: There is abdominal tenderness in the suprapubic area. There is right CVA tenderness. There is no left CVA tenderness.     Musculoskeletal:         General: Normal range of motion.      Cervical back: Normal range of motion and neck supple.     Skin:     General: Skin is warm and dry.      Capillary Refill: Capillary refill takes less than 2 seconds.      Findings: No erythema or rash.     Neurological:      General: No focal deficit present.      Mental Status: She is alert and oriented to person, place, and time. Mental status is at baseline.     Psychiatric:         Mood and Affect: Mood normal.         Behavior: Behavior normal.         Portions of the record may have been created with voice recognition software.  Occasional wrong word or \"sound a like\" substitutions may have occurred due to the inherent limitations of voice recognition software.  Read the chart carefully and recognize, using context, where substitutions have occurred.         [1]   Past Medical History:  Diagnosis Date    Abnormal computed tomography angiography (CTA) of head/neck 12/20/2024    H/O esophageal spasm     Hx of atrial tachycardia     Hx of chronic gastritis     Mitral regurgitation     Occipital pain 12/20/2024    SVT (supraventricular tachycardia) s/p ablation([facility])     Transient Expressive " aphasia 12/20/2024    V-tach (HCC)    [2]   Past Surgical History:  Procedure Laterality Date    ABDOMINAL SURGERY      colon sx (adnoma removed)    APPENDECTOMY      CARDIAC SURGERY      ablasion 09/2015 ([facility])    CHOLECYSTECTOMY     [3]   Family History  Problem Relation Name Age of Onset    No Known Problems Mother      No Known Problems Father     [4]   Allergies  Allergen Reactions    Azithromycin Itching, Anaphylaxis and Rash    Codeine Anaphylaxis    Morphine Anaphylaxis, Hives and Palpitations    Percocet [Oxycodone-Acetaminophen] Anaphylaxis    Promethazine Anaphylaxis    Sulfa Antibiotics Itching, Anaphylaxis and Rash    Latex Rash